# Patient Record
Sex: FEMALE | Race: WHITE | HISPANIC OR LATINO | Employment: FULL TIME | URBAN - METROPOLITAN AREA
[De-identification: names, ages, dates, MRNs, and addresses within clinical notes are randomized per-mention and may not be internally consistent; named-entity substitution may affect disease eponyms.]

---

## 2017-05-10 ENCOUNTER — HOSPITAL ENCOUNTER (OUTPATIENT)
Dept: RADIOLOGY | Facility: MEDICAL CENTER | Age: 22
End: 2017-05-10

## 2017-05-10 ENCOUNTER — HOSPITAL ENCOUNTER (EMERGENCY)
Facility: MEDICAL CENTER | Age: 22
End: 2017-05-10
Attending: EMERGENCY MEDICINE | Admitting: SURGERY
Payer: COMMERCIAL

## 2017-05-10 VITALS
TEMPERATURE: 98.8 F | OXYGEN SATURATION: 94 % | RESPIRATION RATE: 16 BRPM | DIASTOLIC BLOOD PRESSURE: 73 MMHG | HEART RATE: 105 BPM | SYSTOLIC BLOOD PRESSURE: 117 MMHG | BODY MASS INDEX: 27.31 KG/M2 | HEIGHT: 64 IN | WEIGHT: 160 LBS

## 2017-05-10 DIAGNOSIS — K35.30 ACUTE APPENDICITIS WITH LOCALIZED PERITONITIS: ICD-10-CM

## 2017-05-10 LAB — HCG SERPL QL: NEGATIVE

## 2017-05-10 PROCEDURE — 501399 HCHG SPECIMAN BAG, ENDO CATC: Performed by: SURGERY

## 2017-05-10 PROCEDURE — 501570 HCHG TROCAR, SEPARATOR: Performed by: SURGERY

## 2017-05-10 PROCEDURE — 84703 CHORIONIC GONADOTROPIN ASSAY: CPT

## 2017-05-10 PROCEDURE — 160028 HCHG SURGERY MINUTES - 1ST 30 MINS LEVEL 3: Performed by: SURGERY

## 2017-05-10 PROCEDURE — 160002 HCHG RECOVERY MINUTES (STAT): Performed by: SURGERY

## 2017-05-10 PROCEDURE — 96365 THER/PROPH/DIAG IV INF INIT: CPT

## 2017-05-10 PROCEDURE — 160035 HCHG PACU - 1ST 60 MINS PHASE I: Performed by: SURGERY

## 2017-05-10 PROCEDURE — 501838 HCHG SUTURE GENERAL: Performed by: SURGERY

## 2017-05-10 PROCEDURE — A6402 STERILE GAUZE <= 16 SQ IN: HCPCS | Performed by: SURGERY

## 2017-05-10 PROCEDURE — 700111 HCHG RX REV CODE 636 W/ 250 OVERRIDE (IP)

## 2017-05-10 PROCEDURE — 501571 HCHG TROCAR, SEPARATOR 12X100: Performed by: SURGERY

## 2017-05-10 PROCEDURE — 501450 HCHG STAPLES, ENDO MULTIFIRE: Performed by: SURGERY

## 2017-05-10 PROCEDURE — 160025 RECOVERY II MINUTES (STATS): Performed by: SURGERY

## 2017-05-10 PROCEDURE — 160046 HCHG PACU - 1ST 60 MINS PHASE II: Performed by: SURGERY

## 2017-05-10 PROCEDURE — 160036 HCHG PACU - EA ADDL 30 MINS PHASE I: Performed by: SURGERY

## 2017-05-10 PROCEDURE — 700102 HCHG RX REV CODE 250 W/ 637 OVERRIDE(OP)

## 2017-05-10 PROCEDURE — 700101 HCHG RX REV CODE 250

## 2017-05-10 PROCEDURE — 502240 HCHG MISC OR SUPPLY RC 0272: Performed by: SURGERY

## 2017-05-10 PROCEDURE — 160009 HCHG ANES TIME/MIN: Performed by: SURGERY

## 2017-05-10 PROCEDURE — 160048 HCHG OR STATISTICAL LEVEL 1-5: Performed by: SURGERY

## 2017-05-10 PROCEDURE — 502571 HCHG PACK, LAP CHOLE: Performed by: SURGERY

## 2017-05-10 PROCEDURE — A9270 NON-COVERED ITEM OR SERVICE: HCPCS

## 2017-05-10 PROCEDURE — 502594 HCHG SCISSOR HANDLE, HARMONIC ACE: Performed by: SURGERY

## 2017-05-10 PROCEDURE — 160039 HCHG SURGERY MINUTES - EA ADDL 1 MIN LEVEL 3: Performed by: SURGERY

## 2017-05-10 PROCEDURE — 36415 COLL VENOUS BLD VENIPUNCTURE: CPT

## 2017-05-10 PROCEDURE — 501583 HCHG TROCAR, THRD CAN&SEAL 5X100: Performed by: SURGERY

## 2017-05-10 PROCEDURE — 700101 HCHG RX REV CODE 250: Performed by: EMERGENCY MEDICINE

## 2017-05-10 PROCEDURE — 88304 TISSUE EXAM BY PATHOLOGIST: CPT

## 2017-05-10 PROCEDURE — 99291 CRITICAL CARE FIRST HOUR: CPT

## 2017-05-10 RX ORDER — HYDROMORPHONE HYDROCHLORIDE 2 MG/ML
INJECTION, SOLUTION INTRAMUSCULAR; INTRAVENOUS; SUBCUTANEOUS
Status: COMPLETED
Start: 2017-05-10 | End: 2017-05-10

## 2017-05-10 RX ORDER — HYDROCODONE BITARTRATE AND ACETAMINOPHEN 5; 325 MG/1; MG/1
1-2 TABLET ORAL EVERY 6 HOURS PRN
Status: DISCONTINUED | OUTPATIENT
Start: 2017-05-10 | End: 2017-05-10 | Stop reason: HOSPADM

## 2017-05-10 RX ORDER — SODIUM CHLORIDE, SODIUM LACTATE, POTASSIUM CHLORIDE, CALCIUM CHLORIDE 600; 310; 30; 20 MG/100ML; MG/100ML; MG/100ML; MG/100ML
1000 INJECTION, SOLUTION INTRAVENOUS
Status: COMPLETED | OUTPATIENT
Start: 2017-05-10 | End: 2017-05-10

## 2017-05-10 RX ORDER — CEFOTETAN DISODIUM 1 G/10ML
1 INJECTION, POWDER, FOR SOLUTION INTRAMUSCULAR; INTRAVENOUS ONCE
Status: COMPLETED | OUTPATIENT
Start: 2017-05-10 | End: 2017-05-10

## 2017-05-10 RX ORDER — BUPIVACAINE HYDROCHLORIDE AND EPINEPHRINE 5; 5 MG/ML; UG/ML
INJECTION, SOLUTION EPIDURAL; INTRACAUDAL; PERINEURAL
Status: DISCONTINUED | OUTPATIENT
Start: 2017-05-10 | End: 2017-05-10 | Stop reason: HOSPADM

## 2017-05-10 RX ORDER — OXYCODONE HCL 5 MG/5 ML
SOLUTION, ORAL ORAL
Status: COMPLETED
Start: 2017-05-10 | End: 2017-05-10

## 2017-05-10 RX ORDER — HYDROCODONE BITARTRATE AND ACETAMINOPHEN 5; 325 MG/1; MG/1
1-2 TABLET ORAL EVERY 6 HOURS PRN
Qty: 20 TAB | Refills: 0 | Status: SHIPPED | OUTPATIENT
Start: 2017-05-10 | End: 2020-02-02

## 2017-05-10 RX ADMIN — OXYCODONE HYDROCHLORIDE 10 MG: 5 SOLUTION ORAL at 09:15

## 2017-05-10 RX ADMIN — FENTANYL CITRATE 25 MCG: 50 INJECTION, SOLUTION INTRAMUSCULAR; INTRAVENOUS at 09:27

## 2017-05-10 RX ADMIN — CEFOTETAN DISODIUM 1 G: 1 INJECTION, POWDER, FOR SOLUTION INTRAMUSCULAR; INTRAVENOUS at 06:18

## 2017-05-10 RX ADMIN — SODIUM CHLORIDE, SODIUM LACTATE, POTASSIUM CHLORIDE, CALCIUM CHLORIDE 1000 ML: 600; 310; 30; 20 INJECTION, SOLUTION INTRAVENOUS at 07:08

## 2017-05-10 RX ADMIN — FENTANYL CITRATE 25 MCG: 50 INJECTION, SOLUTION INTRAMUSCULAR; INTRAVENOUS at 09:50

## 2017-05-10 RX ADMIN — FENTANYL CITRATE 25 MCG: 50 INJECTION, SOLUTION INTRAMUSCULAR; INTRAVENOUS at 09:21

## 2017-05-10 RX ADMIN — FENTANYL CITRATE 25 MCG: 50 INJECTION, SOLUTION INTRAMUSCULAR; INTRAVENOUS at 09:13

## 2017-05-10 ASSESSMENT — PAIN SCALES - GENERAL
PAINLEVEL_OUTOF10: 4
PAINLEVEL_OUTOF10: 0
PAINLEVEL_OUTOF10: 7
PAINLEVEL_OUTOF10: 5
PAINLEVEL_OUTOF10: 0
PAINLEVEL_OUTOF10: 5
PAINLEVEL_OUTOF10: 6
PAINLEVEL_OUTOF10: 5
PAINLEVEL_OUTOF10: 0

## 2017-05-10 NOTE — DISCHARGE INSTRUCTIONS
ACTIVITY: Rest and take it easy for the first 24 hours.  A responsible adult is recommended to remain with you during that time.  It is normal to feel sleepy.  We encourage you to not do anything that requires balance, judgment or coordination.    MILD FLU-LIKE SYMPTOMS ARE NORMAL. YOU MAY EXPERIENCE GENERALIZED MUSCLE ACHES, THROAT IRRITATION, HEADACHE AND/OR SOME NAUSEA.    FOR 24 HOURS DO NOT:  Drive, operate machinery or run household appliances.  Drink beer or alcoholic beverages.   Make important decisions or sign legal documents.    SPECIAL INSTRUCTIONS: Laparoscopic Appendectomy D/C instructions:     1. DIET: Upon discharge from the hospital you may resume your normal pre-operative diet. Depending on how you are feeling and whether you have nausea or not, you may wish to stay with a bland diet for the first few days. However, you can advance this as quickly as you feel ready.     2. ACTIVITIES: After discharge from the hospital, you may resume full routine activities. However, there should be no heavy lifting (greater than 15 pounds) and no strenuous activities until after your follow-up visit. Otherwise, routine activities of daily living are acceptable.     3. DRIVING: You may drive whenever you are off pain medications and are able to perform the activities needed to drive, i.e. turning, bending, twisting, etc.     4. BATHING: You may get the wound wet at any time after leaving the hospital. You may shower, but do not submerge in a bath for at least a week. Dressings may come off after 48 hours.     5. BOWEL FUNCTION: A few patients, after this operation, will develop either frequent or loose stools after meals. This usually corrects itself after a few days, to a few weeks. If this occurs, do not worry; it is not unusual and will resolve. Much more common than loose stools, is constipation. The combination of pain medication and decreased activity level can cause constipation in otherwise normal  patients. If you feel this is occurring, take a laxative (Milk of Magnesia, Ex-Lax, Senokot, etc.) until the problem has resolved.     6. PAIN MEDICATION: You will be given a prescription for pain medication at discharge. Please take these as directed. It is important to remember not to take medications on an empty stomach as this may cause nausea.     7. CALL IF YOU HAVE: (1) Fevers to more than 101.0 F, (2) Unusual chest or leg pain, (3) Drainage or fluid from incision that may be foul smelling, increased tenderness or soreness at the wound or the wound edges are no longer together, redness or swelling at the incision site. Please do not hesitate to call with any other questions.     8. APPOINTMENT: Contact our office at [phone] for a follow-up appointment in 1 to 2 weeks following your procedure.     If you have any additional questions, please do not hesitate to call the office and speak to either myself or the physician on call.     Office addresses:   [address] [place] [address], [address] [place] [address]     Bonifacio Bacon MD   [facility]   [phone]      DIET: To avoid nausea, slowly advance diet as tolerated, avoiding spicy or greasy foods for the first day.  Add more substantial food to your diet according to your physician's instructions.  Babies can be fed formula or breast milk as soon as they are hungry.  INCREASE FLUIDS AND FIBER TO AVOID CONSTIPATION.    SURGICAL DRESSING/BATHING: *see above*    FOLLOW-UP APPOINTMENT:  A follow-up appointment should be arranged with your doctor in *1-2 weeks*; call to schedule.    You should CALL YOUR PHYSICIAN if you develop:  Fever greater than 101 degrees F.  Pain not relieved by medication, or persistent nausea or vomiting.  Excessive bleeding (blood soaking through dressing) or unexpected drainage from the wound.  Extreme redness or swelling around the incision site, drainage of pus or foul smelling drainage.  Inability to urinate or empty your bladder  within 8 hours.  Problems with breathing or chest pain.    You should call 911 if you develop problems with breathing or chest pain.  If you are unable to contact your doctor or surgical center, you should go to the nearest emergency room or urgent care center.  Physician's telephone #: *Dr. Bacon [phone]*    If any questions arise, call your doctor.  If your doctor is not available, please feel free to call the Surgical Center at [phone].  The Center is open Monday through Friday from 7AM to 7PM.  You can also call the [facility] HOTLINE open 24 hours/day, 7 days/week and speak to a nurse at [phone], or toll free at [phone].    A registered nurse may call you a few days after your surgery to see how you are doing after your procedure.    MEDICATIONS: Resume taking daily medication.  Take prescribed pain medication with food.  If no medication is prescribed, you may take non-aspirin pain medication if needed.  PAIN MEDICATION CAN BE VERY CONSTIPATING.  Take a stool softener or laxative such as senokot, pericolace, or milk of magnesia if needed.    Prescription given for *Norco*.  Last pain medication given at *9:15am*.    If your physician has prescribed pain medication that includes Acetaminophen (Tylenol), do not take additional Acetaminophen (Tylenol) while taking the prescribed medication.    Depression / Suicide Risk    As you are discharged from this [facility] facility, it is important to learn how to keep safe from harming yourself.    Recognize the warning signs:  · Abrupt changes in personality, positive or negative- including increase in energy   · Giving away possessions  · Change in eating patterns- significant weight changes-  positive or negative  · Change in sleeping patterns- unable to sleep or sleeping all the time   · Unwillingness or inability to communicate  · Depression  · Unusual sadness, discouragement and loneliness  · Talk of wanting to die  · Neglect of personal  appearance   · Rebelliousness- reckless behavior  · Withdrawal from people/activities they love  · Confusion- inability to concentrate     If you or a loved one observes any of these behaviors or has concerns about self-harm, here's what you can do:  · Talk about it- your feelings and reasons for harming yourself  · Remove any means that you might use to hurt yourself (examples: pills, rope, extension cords, firearm)  · Get professional help from the community (Mental Health, Substance Abuse, psychological counseling)  · Do not be alone:Call your Safe Contact- someone whom you trust who will be there for you.  · Call your local CRISIS HOTLINE 726-1857 or 388-589-8052  · Call your local Children's Mobile Crisis Response Team [place] (678) 550-4050 or www.[employer]  · Call the toll free National Suicide Prevention Hotlines   · National Suicide Prevention Lifeline 248-638-YIYG (7614)  · National Hope Line Network 800-SUICIDE (658-0174)

## 2017-05-10 NOTE — ED PROVIDER NOTES
"ED Provider Note    CHIEF COMPLAINT  Chief Complaint   Patient presents with   • Abdominal Pain     RLQ.  Appendicitis.       HPI  Josee Go is a 21 y.o. female who presents to the emergency room with abdominal pain. Started Monday afternoon. Constant. Right lower quadrant. Nonradiating. Had fevers. Patient seen at another hospital. Diagnosed with appendicitis on CT scan. Patient is given morphine and reports her pain is improved. She received Unasyn sometime around 2 this morning. Apparently at the [address] [place] [address], the physician was told that insurance was not accepted there. The patient was sent to [facility] that insurance was accepted at that facility. Unfortunately they do not accept that insurance and the patient was referred here. I was told prior to transfer that we are the primary hospital for [facility] insurance. Patient states that she feels better at this time after analgesics. She is received 3 L of saline thus far prior to my assessment.    REVIEW OF SYSTEMS  Pertinent negative: No pain nausea vomiting at this time. No dysuria or hematuria. She is not pregnant.    PAST MEDICAL HISTORY  Negative    SOCIAL HISTORY  Social History   Substance Use Topics   • Smoking status: Never Smoker    • Smokeless tobacco: Never Used   • Alcohol Use: No       SURGICAL HISTORY  No abdominal surgeries    ALLERGIES  No Known Allergies    PHYSICAL EXAM  VITAL SIGNS: /75 mmHg  Pulse 110  Temp(Src) 36.8 °C (98.2 °F)  Resp 25  Ht 1.626 m (5' 4\")  Wt 72.576 kg (160 lb)  BMI 27.45 kg/m2  LMP 04/20/2017 (Approximate)   Constitutional: Awake and alert. Nontoxic  HENT:  Grossly normal  Eyes: Grossly normal  Neck: Normal range of motion  Cardiovascular: Normal heart rate   Thorax & Lungs: No respiratory distress  Abdomen: Focal tenderness to palpation in the right lower quadrant. No rebound or peritonitis. Normal bowel sounds.  Skin:  No pathologic rash.   Extremities: " Well perfused  Psychiatric: Affect normal    RADIOLOGY/PROCEDURES  CT scan report was reviewed from outside facility demonstrating appendicitis.     Imaging is interpreted by radiologist    Labs:  CBC: WBC count 8.3. Urinalysis with 2+ leukocyte esterase.    Results for orders placed or performed during the hospital encounter of 05/10/17   HCG QUAL SERUM   Result Value Ref Range    Beta-Hcg Qualitative Serum Negative Negative     COURSE & MEDICAL DECISION MAKING  Patient presents to this ER with appendicitis. He is received 3 L of fluid so far. She declines any pain medication. She was given Unasyn prior to arrival. I discussed with Dr. Bacon. He requested cefotetan. This was ordered. Plan is for patient to go to the operating room this morning.    FINAL IMPRESSION  1. Acute appendicitis      Disposition: Admission      This dictation was created using voice recognition software. The accuracy of the dictation is limited to the abilities of the software.  The nursing notes were reviewed and certain aspects of this information were incorporated into this note.      Electronically signed by: Zack Ogden, 5/10/2017 5:24 AM

## 2017-05-10 NOTE — ED NOTES
.  Chief Complaint   Patient presents with   • Abdominal Pain     RLQ.  Appendicitis.     BIB [facility] for above.  Patient transferred from [facility] to [place] to [facility] r/t insurance coverage.    Given Unasyn, 8 mg total morphine, 8 mg total zofran, 3 L NS PTA.      Denies pain/n/v other symptoms at this time.

## 2017-05-10 NOTE — OP REPORT
Operative Report    Date: 5/10/2017    Surgeon: Bonifacio Bacon MD    Assistant: none    Pre-operative Diagnosis: acute appendicitis    Post-operative Diagnosis: acute appendicitis     Procedure: laparoscopic appendectomy    Indications: 21 year old female with right lower quadrant pain and a CT scan showing appendicitis    An extensive PARQ conference was held with the patient and family, in regard to the surgical treatment of acute appendicitis. The patient was counseled regarding the benefits of the operation, namely, laparoscopic VS open appendectomy. The patient was made aware of the alternatives, including operative and non-operative management. The risks of bleeding, infection, damage to surrounding structures, need for reoperation, stroke, MI, and death were discussed with the patient. The patient was given a chance to ask questions, and all questions were answered. The patient and family demonstrated adequate understanding, seemed pleased with the plan, and wished to proceed. Consent was signed.    Findings: acute, non ruptured    Procedure in detail: The patient was identified in the pre-operative holding area and brought to the operating room. Correct side and site were identified. Milena-operative antibiotics of cefotan were administered prior to the procedure. GETA was smoothly induced. The patient was prepped and draped in the usual sterile fashion.     After infiltration of local anesthetic, an incision was made below the umbilicus to accomodate a 5 mm trocar. The veress needle was placed intraperitoneally, and sterile saline drop test was performed. The abdomen was insufflated to 15 mmHg, which the patient tolerated well, with initially low insufflation pressures. A 5 mm trocar was placed, and a 5 mm 30 degree lapararoscope was used to survey the abdomen. No evidence of intraperitoneal trauma from Veress or trocar placement was found.    I then placed, under video assistance, a 5 mm trocar in the  suprapubic area, and a 12 mm trocar in the left lower quadrant. I visualized the cecum and found the appendix, it was inflamed. The appendix was elevated with an atraumatic grasper, a window was created in its mesentery with a [place] dissector, and the appendiceal mesentery was divided with a the harmonic. The base of the appendix was divided with a white load of the endoGIA stapler. The appendix was placed in an endocatch bag and removed from the 12 mm port site. The appendiceal artery and base were hemostatic.    All ports were removed with video assist, and were hemostatic. The 12 mm port site fascia was closed with 0 vicryl suture. All skin sites were closed with subcuticular Monocryl and sterile dressings were applied.    The patient was awakened from general anesthetic, and was taken to the recovery room in stable condition.    Sponge and needle counts were correct at the end of the case.     Specimen: appendix for permanent pathology    EBL: 5 mLs    Dispo: home    Bonifacio Bacon MD  [facility]  [phone]

## 2017-05-10 NOTE — ED NOTES
Started IV antibiodics.  Gave report to Pre-Op.  Patient in gown only, up to bathroom to void.  Last oral intake at 1900 last night. Estimated time for pre-op to pick patient up is 0700.  Patient aware.  VSS at this time.

## 2017-05-10 NOTE — H&P
Surgical History and Physical    Date: 5/10/2017    Requesting Physician: Dr. Ogden  PCP: Shiva Benjamin M.D.  Attending Physician: Bonifacio Bacon M.D. [facility]    CC: abdominal pain    HPI: This is a 21 y.o. female who is has had abdominal pain for the past 48 hours. Her pain is located in the right lower quadrant, does not radiate, is mild in severity, and has no exacerbating or alleviating factors. Denies vomiting but does have some nausea. Denies diarrhea. She was sent from [facility] to [facility], then here for insurance reasons.     History reviewed. No pertinent past medical history.    History reviewed. No pertinent past surgical history.    No current facility-administered medications for this encounter.       Social History     Social History   • Marital Status: Single     Spouse Name: N/A   • Number of Children: N/A   • Years of Education: N/A     Occupational History   • Not on file.     Social History Main Topics   • Smoking status: Never Smoker    • Smokeless tobacco: Never Used   • Alcohol Use: No   • Drug Use: No   • Sexual Activity: Not on file     Other Topics Concern   • Not on file     Social History Narrative   • No narrative on file       History reviewed. No pertinent family history.    Allergies:  Review of patient's allergies indicates no known allergies.    Review of Systems:  Constitutional: Negative for fever, chills, weight loss, malaise/fatigue and diaphoresis.   HENT: Negative for hearing loss, ear pain, nosebleeds, congestion, sore throat, neck pain, tinnitus and ear discharge.    Eyes: Negative for blurred vision, double vision, photophobia, pain, discharge and redness.   Respiratory: Negative for cough, hemoptysis, sputum production, shortness of breath, wheezing and stridor.    Cardiovascular: Negative for chest pain, palpitations, orthopnea, claudication, leg swelling and PND.   Gastrointestinal: per hpi  Genitourinary: Negative for dysuria, urgency, frequency,  "hematuria and flank pain.   Musculoskeletal: Negative for myalgias, back pain, joint pain and falls.   Skin: Negative for itching and rash.  Neurological: Negative for dizziness, tingling, tremors, sensory change, speech change, focal weakness, seizures, loss of consciousness, weakness and headaches.   Endo/Heme/Allergies: Negative for environmental allergies and polydipsia. Does not bruise/bleed easily.   Psychiatric/Behavioral: Negative for depression, memory loss, and substance abuse. The patient is not nervous/anxious and does not have insomnia.    Physical Exam:  Blood pressure 117/73, pulse 105, temperature 36.9 °C (98.4 °F), resp. rate 18, height 1.626 m (5' 4\"), weight 72.576 kg (160 lb), last menstrual period 04/20/2017, SpO2 98 %.    Constitutional: she is oriented to person, place, and time.  she appears well-developed and well-nourished. No distress.   Head: Normocephalic and atraumatic.   Neck: Normal range of motion. Neck supple. No JVD present. No tracheal deviation present. No thyromegaly present.   Cardiovascular: Normal rate, regular rhythm, normal heart sounds and intact distal pulses.  Exam reveals no gallop and no friction rub.  No murmur heard.  Pulmonary/Chest: Effort normal and breath sounds normal. No stridor. No respiratory distress. she has no wheezes. There are no rales.   Abdominal: Soft. Bowel sounds are normal. she exhibits no distension and no mass. There is no tenderness. There is no rebound and no guarding.   Musculoskeletal: Normal range of motion. she exhibits no edema and no tenderness.   Neurological: she is alert and oriented to person, place, and time. she has normal reflexes. No cranial nerve deficit. Coordination normal.   Skin: Skin is warm and dry. No rash noted. she is not diaphoretic. No erythema. No pallor.   Psychiatric: she has a normal mood and affect.  Behavior is normal.       Labs: from referring were reportedly negative                    Radiology:  OUTSIDE " IMAGES-CT ABDOMEN /PELVIS   Preliminary Result      CT from referring shows a large dilated appendix lying medially off the colon    Assessment: This is a 22 yo female with acute appendicitis      Plan: Cefotan given in ER  PARQ held, plan for laparoscopic appendectomy    Thank you very much for this consultation.    Bonifacio Bacon MD  [facility]  [phone]    Time spent: 35 minutes

## 2017-05-10 NOTE — IP AVS SNAPSHOT
" Home Care Instructions                                                                                                                Name:Josee Go  Medical Record Number:[id number]  CSN: [id number]    YOB: 1995   Age: 21 y.o.  Sex: female  HT:1.626 m (5' 4\") WT: 72.576 kg (160 lb)          Admit Date: 5/10/2017     Discharge Date:   Today's Date: 5/10/2017  Attending Doctor:  Pamela att. providers found                  Allergies:  Review of patient's allergies indicates no known allergies.                Discharge Instructions         ACTIVITY: Rest and take it easy for the first 24 hours.  A responsible adult is recommended to remain with you during that time.  It is normal to feel sleepy.  We encourage you to not do anything that requires balance, judgment or coordination.    MILD FLU-LIKE SYMPTOMS ARE NORMAL. YOU MAY EXPERIENCE GENERALIZED MUSCLE ACHES, THROAT IRRITATION, HEADACHE AND/OR SOME NAUSEA.    FOR 24 HOURS DO NOT:  Drive, operate machinery or run household appliances.  Drink beer or alcoholic beverages.   Make important decisions or sign legal documents.    SPECIAL INSTRUCTIONS: Laparoscopic Appendectomy D/C instructions:     1. DIET: Upon discharge from the hospital you may resume your normal pre-operative diet. Depending on how you are feeling and whether you have nausea or not, you may wish to stay with a bland diet for the first few days. However, you can advance this as quickly as you feel ready.     2. ACTIVITIES: After discharge from the hospital, you may resume full routine activities. However, there should be no heavy lifting (greater than 15 pounds) and no strenuous activities until after your follow-up visit. Otherwise, routine activities of daily living are acceptable.     3. DRIVING: You may drive whenever you are off pain medications and are able to perform the activities needed to drive, i.e. turning, bending, twisting, etc.     4. BATHING: You may get the wound wet at any time " after leaving the hospital. You may shower, but do not submerge in a bath for at least a week. Dressings may come off after 48 hours.     5. BOWEL FUNCTION: A few patients, after this operation, will develop either frequent or loose stools after meals. This usually corrects itself after a few days, to a few weeks. If this occurs, do not worry; it is not unusual and will resolve. Much more common than loose stools, is constipation. The combination of pain medication and decreased activity level can cause constipation in otherwise normal patients. If you feel this is occurring, take a laxative (Milk of Magnesia, Ex-Lax, Senokot, etc.) until the problem has resolved.     6. PAIN MEDICATION: You will be given a prescription for pain medication at discharge. Please take these as directed. It is important to remember not to take medications on an empty stomach as this may cause nausea.     7. CALL IF YOU HAVE: (1) Fevers to more than 101.0 F, (2) Unusual chest or leg pain, (3) Drainage or fluid from incision that may be foul smelling, increased tenderness or soreness at the wound or the wound edges are no longer together, redness or swelling at the incision site. Please do not hesitate to call with any other questions.     8. APPOINTMENT: Contact our office at [phone] for a follow-up appointment in 1 to 2 weeks following your procedure.     If you have any additional questions, please do not hesitate to call the office and speak to either myself or the physician on call.     Office addresses:   [address] [place] [address], [address] [facility] [place] [address]     Bonifacio Bacon MD   [facility]   [phone]      DIET: To avoid nausea, slowly advance diet as tolerated, avoiding spicy or greasy foods for the first day.  Add more substantial food to your diet according to your physician's instructions.  Babies can be fed formula or breast milk as soon as they are hungry.  INCREASE FLUIDS AND FIBER TO AVOID  CONSTIPATION.    SURGICAL DRESSING/BATHING: *see above*    FOLLOW-UP APPOINTMENT:  A follow-up appointment should be arranged with your doctor in *1-2 weeks*; call to schedule.    You should CALL YOUR PHYSICIAN if you develop:  Fever greater than 101 degrees F.  Pain not relieved by medication, or persistent nausea or vomiting.  Excessive bleeding (blood soaking through dressing) or unexpected drainage from the wound.  Extreme redness or swelling around the incision site, drainage of pus or foul smelling drainage.  Inability to urinate or empty your bladder within 8 hours.  Problems with breathing or chest pain.    You should call 911 if you develop problems with breathing or chest pain.  If you are unable to contact your doctor or surgical center, you should go to the nearest emergency room or urgent care center.  Physician's telephone #: *Dr. Bacon [phone]*    If any questions arise, call your doctor.  If your doctor is not available, please feel free to call the Surgical Center at [phone].  The Center is open Monday through Friday from 7AM to 7PM.  You can also call the [facility] HOTLINE open 24 hours/day, 7 days/week and speak to a nurse at [phone], or toll free at [phone].    A registered nurse may call you a few days after your surgery to see how you are doing after your procedure.    MEDICATIONS: Resume taking daily medication.  Take prescribed pain medication with food.  If no medication is prescribed, you may take non-aspirin pain medication if needed.  PAIN MEDICATION CAN BE VERY CONSTIPATING.  Take a stool softener or laxative such as senokot, pericolace, or milk of magnesia if needed.    Prescription given for *Norco*.  Last pain medication given at *9:15am*.    If your physician has prescribed pain medication that includes Acetaminophen (Tylenol), do not take additional Acetaminophen (Tylenol) while taking the prescribed medication.    Depression / Suicide Risk    As you are  discharged from this [facility] facility, it is important to learn how to keep safe from harming yourself.    Recognize the warning signs:  · Abrupt changes in personality, positive or negative- including increase in energy   · Giving away possessions  · Change in eating patterns- significant weight changes-  positive or negative  · Change in sleeping patterns- unable to sleep or sleeping all the time   · Unwillingness or inability to communicate  · Depression  · Unusual sadness, discouragement and loneliness  · Talk of wanting to die  · Neglect of personal appearance   · Rebelliousness- reckless behavior  · Withdrawal from people/activities they love  · Confusion- inability to concentrate     If you or a loved one observes any of these behaviors or has concerns about self-harm, here's what you can do:  · Talk about it- your feelings and reasons for harming yourself  · Remove any means that you might use to hurt yourself (examples: pills, rope, extension cords, firearm)  · Get professional help from the community (Mental Health, Substance Abuse, psychological counseling)  · Do not be alone:Call your Safe Contact- someone whom you trust who will be there for you.  · Call your local CRISIS HOTLINE 985-9119 or 927-699-9894  · Call your local Children's Mobile Crisis Response Team [place] (943) 353-3595 or www.nubelo  · Call the toll free National Suicide Prevention Hotlines   · National Suicide Prevention Lifeline 210-907-LYCO (3212)  · National Hope Line Network 800-SUICIDE (952-0920)       Medication List      START taking these medications        Instructions    Morning Afternoon Evening Bedtime    hydrocodone-acetaminophen 5-325 MG Tabs per tablet   Commonly known as:  NORCO        Take 1-2 Tabs by mouth every 6 hours as needed.   Dose:  1-2 Tab                             Where to Get Your Medications      You can get these medications from any pharmacy     Bring a paper prescription for each of  these medications    - hydrocodone-acetaminophen 5-325 MG Tabs per tablet            Medication Information     Above and/or attached are the medications your physician expects you to take upon discharge. Review all of your home medications and newly ordered medications with your doctor and/or pharmacist. Follow medication instructions as directed by your doctor and/or pharmacist. Please keep your medication list with you and share with your physician. Update the information when medications are discontinued, doses are changed, or new medications (including over-the-counter products) are added; and carry medication information at all times in the event of emergency situations.        Resources     Quit Smoking / Tobacco Use:    I understand the use of any tobacco products increases my chance of suffering from future heart disease or stroke and could cause other illnesses which may shorten my life. Quitting the use of tobacco products is the single most important thing I can do to improve my health. For further information on smoking / tobacco cessation call a Toll Free Quit Line at 1-733.982.3737 (*National [employer] [place]) or 1-996.355.9829 (American Lung Association) or you can access the web based program at www.lungusa.org.    [place] Tobacco Users Help Line:  (669) 485-4636       Toll Free: 1-321.329.9312  Quit Tobacco Program [facility] (428)859-3612    Crisis Hotline:    [place] Crisis Hotline:  1-960-SVPRKNX or 1-922.247.5509    [place] Crisis Hotline:    1-847.478.8819 or 918-686-7747    Discharge Survey:   Thank you for choosing [facility]. We hope we did everything we could to make your hospital stay a pleasant one. You may be receiving a survey and we would appreciate your time and participation in answering the questions. Your input is very valuable to us in our efforts to improve our service to our patients and their families.            Signatures     My signature on this  form indicates that:    1. I acknowledge receipt and understanding of these Home Care Instruction.  2. My questions regarding this information have been answered to my satisfaction.  3. I have formulated a plan with my discharge nurse to obtain my prescribed medications for home.    __________________________________      __________________________________                   Patient Signature                                 Guardian/Responsible Adult Signature      __________________________________                 __________       ________                       Nurse Signature                                               Date                 Time

## 2017-05-10 NOTE — IP AVS SNAPSHOT
5/10/2017    Josee Go  [address] [place] [address]    Dear Josee:    Critical access hospital wants to ensure your discharge home is safe and you or your loved ones have had all of your questions answered regarding your care after you leave the hospital.    Below is a list of resources and contact information should you have any questions regarding your hospital stay, follow-up instructions, or active medical symptoms.    Questions or Concerns Regarding… Contact   Medical Questions Related to Your Discharge  (7 days a week, 8am-5pm) Contact a Nurse Care Coordinator   [phone]   Medical Questions Not Related to Your Discharge  (24 hours a day / 7 days a week)  Contact the Nurse Health Line   [phone]    Medications or Discharge Instructions Refer to your discharge packet   or contact your [facility] Primary Care Provider   [phone]   Follow-up Appointment(s) Schedule your appointment via PANOSOL   or contact Scheduling [phone]   Billing Review your statement via PANOSOL  or contact Billing [phone]   Medical Records Review your records via PANOSOL   or contact Medical Records [phone]     You may receive a telephone call within two days of discharge. This call is to make certain you understand your discharge instructions and have the opportunity to have any questions answered. You can also easily access your medical information, test results and upcoming appointments via the PANOSOL free online health management tool. You can learn more and sign up at [employer]/PANOSOL. For assistance setting up your PANOSOL account, please call [phone].    Once again, we want to ensure your discharge home is safe and that you have a clear understanding of any next steps in your care. If you have any questions or concerns, please do not hesitate to contact us, we are here for you. Thank you for choosing [facility] for your healthcare needs.    Sincerely,    Your [facility] Healthcare Team

## 2017-05-10 NOTE — OR NURSING
Pt verbalizes readiness for discharge. Instructions reviewed with pt and pt's family. No further needs.

## 2018-05-21 ENCOUNTER — OFFICE VISIT (OUTPATIENT)
Dept: URGENT CARE | Facility: PHYSICIAN GROUP | Age: 23
End: 2018-05-21
Payer: COMMERCIAL

## 2018-05-21 VITALS
TEMPERATURE: 98.1 F | HEIGHT: 65 IN | SYSTOLIC BLOOD PRESSURE: 126 MMHG | OXYGEN SATURATION: 98 % | HEART RATE: 94 BPM | BODY MASS INDEX: 27.92 KG/M2 | DIASTOLIC BLOOD PRESSURE: 62 MMHG | WEIGHT: 167.6 LBS

## 2018-05-21 DIAGNOSIS — M54.50 ACUTE BILATERAL LOW BACK PAIN WITHOUT SCIATICA: ICD-10-CM

## 2018-05-21 DIAGNOSIS — M62.838 MUSCLE SPASM: ICD-10-CM

## 2018-05-21 LAB
APPEARANCE UR: CLEAR
BILIRUB UR STRIP-MCNC: NORMAL MG/DL
COLOR UR AUTO: NORMAL
GLUCOSE UR STRIP.AUTO-MCNC: NORMAL MG/DL
INT CON NEG: NEGATIVE
INT CON POS: POSITIVE
KETONES UR STRIP.AUTO-MCNC: NORMAL MG/DL
LEUKOCYTE ESTERASE UR QL STRIP.AUTO: NORMAL
NITRITE UR QL STRIP.AUTO: NORMAL
PH UR STRIP.AUTO: 5 [PH] (ref 5–8)
POC URINE PREGNANCY TEST: NORMAL
PROT UR QL STRIP: NORMAL MG/DL
RBC UR QL AUTO: NORMAL
SP GR UR STRIP.AUTO: 1.01
UROBILINOGEN UR STRIP-MCNC: NORMAL MG/DL

## 2018-05-21 PROCEDURE — 81002 URINALYSIS NONAUTO W/O SCOPE: CPT | Performed by: NURSE PRACTITIONER

## 2018-05-21 PROCEDURE — 99204 OFFICE O/P NEW MOD 45 MIN: CPT | Performed by: NURSE PRACTITIONER

## 2018-05-21 PROCEDURE — 81025 URINE PREGNANCY TEST: CPT | Performed by: NURSE PRACTITIONER

## 2018-05-21 RX ORDER — CYCLOBENZAPRINE HCL 5 MG
5 TABLET ORAL 2 TIMES DAILY PRN
Qty: 8 TAB | Refills: 0 | Status: SHIPPED | OUTPATIENT
Start: 2018-05-21 | End: 2018-05-25

## 2018-05-21 NOTE — LETTER
May 21, 2018       Patient: Josee Go   YOB: 1995   Date of Visit: 5/21/2018         To Whom It May Concern:    It is my medical opinion that Josee Go be excused from lifting/carrying objects > 10# or standing for no more than 5 hrs /shift. May return to regular job duties on 5/28/18.  If you have any questions or concerns, please don't hesitate to call [phone]          Sincerely,          STELLA Mi.N.P.  Electronically Signed

## 2018-05-22 ASSESSMENT — ENCOUNTER SYMPTOMS
FALLS: 0
FEVER: 0
NAUSEA: 0
SENSORY CHANGE: 0
ABDOMINAL PAIN: 0
BACK PAIN: 1
MYALGIAS: 1
SHORTNESS OF BREATH: 0
TINGLING: 0
WEAKNESS: 0
CHILLS: 0
VOMITING: 0
COUGH: 0
FLANK PAIN: 0

## 2018-05-22 NOTE — PROGRESS NOTES
"Subjective:      Josee Go is a 22 y.o. female who presents with Back Pain (lower back pain, not radiating x 2 days )            HPI  Josee is here for acute low back pain x 2 days. No previous back injury. Unknown cause of pain. Works out 3x/week- last session 3 days ago- cardio only. Denies dysuria, hematuria, presently on menstrual cycle- on last day- \"regular blood flow\". Twisting, sitting, bending over causes pain. \"Throbbing pain\".  Intermittent Ibuprofen use. 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    PMH:  has no past medical history on file.  MEDS:   Current Outpatient Prescriptions:   •  hydrocodone-acetaminophen (NORCO) 5-325 MG Tab per tablet, Take 1-2 Tabs by mouth every 6 hours as needed. (Patient not taking: Reported on 5/21/2018), Disp: 20 Tab, Rfl: 0  ALLERGIES: No Known Allergies  SURGHX:   Past Surgical History:   Procedure Laterality Date   • APPENDECTOMY LAPAROSCOPIC  5/10/2017    Procedure: APPENDECTOMY LAPAROSCOPIC;  Surgeon: Bonifacio Bacon M.D.;  Location: SURGERY [facility];  Service:      SOCHX:  reports that she has never smoked. She has never used smokeless tobacco. She reports that she does not drink alcohol or use drugs.  FH: Family history was reviewed, no pertinent findings to report    Review of Systems   Constitutional: Negative for chills, fever and malaise/fatigue.   Respiratory: Negative for cough and shortness of breath.    Gastrointestinal: Negative for abdominal pain, nausea and vomiting.   Genitourinary: Negative for dysuria, flank pain, frequency, hematuria and urgency.   Musculoskeletal: Positive for back pain and myalgias. Negative for falls.   Neurological: Negative for tingling, sensory change and weakness.   All other systems reviewed and are negative.         Objective:     /62   Pulse 94   Temp 36.7 °C (98.1 °F)   Ht 1.638 m (5' 4.5\")   Wt 76 kg (167 lb 9.6 oz)   LMP 05/16/2018   SpO2 98%   Breastfeeding? No   BMI 28.32 kg/m²  "     Physical Exam   Constitutional: She is oriented to person, place, and time. Vital signs are normal. She appears well-developed and well-nourished. She is active and cooperative.  Non-toxic appearance. She does not have a sickly appearance. She does not appear ill. No distress.   HENT:   Head: Normocephalic.   Eyes: Conjunctivae and EOM are normal. Pupils are equal, round, and reactive to light.   Neck: Normal range of motion. Neck supple.   Cardiovascular: Normal rate and regular rhythm.    Pulmonary/Chest: Effort normal and breath sounds normal. No accessory muscle usage. No respiratory distress.   Musculoskeletal:        Lumbar back: She exhibits decreased range of motion, tenderness, pain and spasm. She exhibits no bony tenderness, no swelling, no edema, no deformity and no laceration.   Neurological: She is alert and oriented to person, place, and time.   Skin: Skin is warm and dry. She is not diaphoretic.   Vitals reviewed.              Assessment/Plan:     1. Acute bilateral low back pain without sciatica    - POCT Urinalysis: blood (on menstrual cycle)  - POCT Pregnancy: NEG    2. Muscle spasm    - cyclobenzaprine (FLEXERIL) 5 MG tablet; Take 1 Tab by mouth 2 times a day as needed for Muscle Spasms for up to 4 days. Causes drowsiness, do not drive or work while using.  Dispense: 8 Tab; Refill: 0    Take Ibuprofen prn for discomfort  May use cool compresses for swelling and warm compresses for muscle stiffness   May perform muscle stretches as tolerated after warm compresses to maintain mobility, avoid abdominal crunches  May continue Flexeril prn when at home only   May apply topical analgesics prn  Perform proper body mechanics with lifting, twisting, bending and reaching. Ask for assistance with heay objects  Monitor for bowel/urination problems, numbness/tingling in lower extremities, decreased ROM with ambulation difficulty- need re-evaluation  Work note provided

## 2019-03-23 ENCOUNTER — HOSPITAL ENCOUNTER (OUTPATIENT)
Facility: MEDICAL CENTER | Age: 24
End: 2019-03-23
Attending: PHYSICIAN ASSISTANT
Payer: COMMERCIAL

## 2019-03-23 ENCOUNTER — OFFICE VISIT (OUTPATIENT)
Dept: URGENT CARE | Facility: PHYSICIAN GROUP | Age: 24
End: 2019-03-23
Payer: COMMERCIAL

## 2019-03-23 VITALS
SYSTOLIC BLOOD PRESSURE: 112 MMHG | HEART RATE: 100 BPM | BODY MASS INDEX: 29.23 KG/M2 | HEIGHT: 63 IN | OXYGEN SATURATION: 97 % | DIASTOLIC BLOOD PRESSURE: 62 MMHG | WEIGHT: 165 LBS | TEMPERATURE: 98.9 F | RESPIRATION RATE: 16 BRPM

## 2019-03-23 DIAGNOSIS — N30.00 ACUTE CYSTITIS WITHOUT HEMATURIA: ICD-10-CM

## 2019-03-23 DIAGNOSIS — B35.4 TINEA OF THE BODY: ICD-10-CM

## 2019-03-23 DIAGNOSIS — N89.8 VAGINAL DISCHARGE: ICD-10-CM

## 2019-03-23 DIAGNOSIS — R39.15 URINARY URGENCY: ICD-10-CM

## 2019-03-23 LAB
APPEARANCE UR: NORMAL
BILIRUB UR STRIP-MCNC: NEGATIVE MG/DL
COLOR UR AUTO: YELLOW
GLUCOSE UR STRIP.AUTO-MCNC: NEGATIVE MG/DL
INT CON NEG: NEGATIVE
INT CON POS: POSITIVE
KETONES UR STRIP.AUTO-MCNC: NEGATIVE MG/DL
LEUKOCYTE ESTERASE UR QL STRIP.AUTO: NORMAL
NITRITE UR QL STRIP.AUTO: POSITIVE
PH UR STRIP.AUTO: 7 [PH] (ref 5–8)
POC URINE PREGNANCY TEST: NEGATIVE
PROT UR QL STRIP: NEGATIVE MG/DL
RBC UR QL AUTO: NORMAL
SP GR UR STRIP.AUTO: 1.02
UROBILINOGEN UR STRIP-MCNC: 0.2 MG/DL

## 2019-03-23 PROCEDURE — 87660 TRICHOMONAS VAGIN DIR PROBE: CPT

## 2019-03-23 PROCEDURE — 87480 CANDIDA DNA DIR PROBE: CPT

## 2019-03-23 PROCEDURE — 99214 OFFICE O/P EST MOD 30 MIN: CPT | Performed by: PHYSICIAN ASSISTANT

## 2019-03-23 PROCEDURE — 81025 URINE PREGNANCY TEST: CPT | Performed by: PHYSICIAN ASSISTANT

## 2019-03-23 PROCEDURE — 81002 URINALYSIS NONAUTO W/O SCOPE: CPT | Performed by: PHYSICIAN ASSISTANT

## 2019-03-23 PROCEDURE — 87510 GARDNER VAG DNA DIR PROBE: CPT

## 2019-03-23 RX ORDER — NITROFURANTOIN 25; 75 MG/1; MG/1
100 CAPSULE ORAL EVERY 12 HOURS
Qty: 10 CAP | Refills: 0 | Status: SHIPPED | OUTPATIENT
Start: 2019-03-23 | End: 2019-03-28

## 2019-03-23 RX ORDER — KETOCONAZOLE 20 MG/G
CREAM TOPICAL
Qty: 1 TUBE | Refills: 1 | Status: SHIPPED | OUTPATIENT
Start: 2019-03-23 | End: 2021-03-01

## 2019-03-23 ASSESSMENT — ENCOUNTER SYMPTOMS
ABDOMINAL PAIN: 0
FEVER: 0
VOMITING: 0
CHILLS: 0
SHORTNESS OF BREATH: 0
NAUSEA: 0
FLANK PAIN: 0

## 2019-03-23 NOTE — PROGRESS NOTES
"Subjective:   Josee Go is a 23 y.o. female who presents for UTI (x 4 days )        UTI   This is a new problem. The current episode started in the past 7 days. Pertinent negatives include no abdominal pain, chills, fever, nausea, rash or vomiting.     Notes last 4d of vaginal irritation, itch, some discharge white, denies dysuria/freq, denies hematuria, PMH of UTI years ago, denies recent medications, LMP was   Denies PMH of complicated UTI.  Denies PMH of pyeolnephritis.  Denies concern of STi or chance of preg.     Review of Systems   Constitutional: Negative for chills and fever.   Respiratory: Negative for shortness of breath.    Gastrointestinal: Negative for abdominal pain, nausea and vomiting.   Genitourinary: Positive for frequency and urgency. Negative for dysuria, flank pain and hematuria.        Some abnormal d/c   Skin: Negative for rash.     No Known Allergies   Objective:   /62   Pulse 100   Temp 37.2 °C (98.9 °F) (Temporal)   Resp 16   Ht 1.6 m (5' 3\")   Wt 74.8 kg (165 lb)   SpO2 97%   BMI 29.23 kg/m²   Physical Exam   Constitutional: She is oriented to person, place, and time. She appears well-developed and well-nourished. No distress.   HENT:   Head: Normocephalic and atraumatic.   Right Ear: External ear normal.   Left Ear: External ear normal.   Nose: Nose normal.   Eyes: Conjunctivae and lids are normal. Right eye exhibits no discharge. Left eye exhibits no discharge. No scleral icterus.   Neck: Neck supple.   Pulmonary/Chest: Effort normal. No respiratory distress.   Abdominal: Soft. Normal appearance. There is no tenderness. There is no rigidity, no guarding and no CVA tenderness.   Musculoskeletal: Normal range of motion.   Neurological: She is alert and oriented to person, place, and time. She is not disoriented.   Skin: Skin is warm and dry. Rash noted. Rash is not nodular, not pustular, not vesicular and not urticarial. She is not diaphoretic. No erythema. No pallor. "   Slightly raised broad rash w/ darker pigment, scaly advancing border, no focal lesions, non ulcerative/non urticarial/non pustular   Psychiatric: Her speech is normal and behavior is normal.   Nursing note and vitals reviewed.  POCT UA- +++  Vag path - pending      Assessment/Plan:   1. Acute cystitis without hematuria  - POCT Urinalysis  - POCT PREGNANCY  - nitrofurantoin monohyd macro (MACROBID) 100 MG Cap; Take 1 Cap by mouth every 12 hours for 5 days.  Dispense: 10 Cap; Refill: 0    2. Urinary urgency    3. Tinea of the body  - ketoconazole (NIZORAL) 2 % Cream; Apply to affected area twice daily; continue treatment 1 week past resolution of rash  Dispense: 1 Tube; Refill: 1    4. Vaginal discharge  - VAGINAL PATHOGENS DNA PANEL; Future  Supportive care is reviewed with patient/caregiver - recommend to push PO fluids and electrolytes, nsaids/tylenol, rest, full course of abx, probiotics w/ abx, azo/cran, observe for resolution  Return to clinic with lack of resolution or progression of symptoms.   will call w/results of vag path  Differential diagnosis, natural history, supportive care, and indications for immediate follow-up discussed.

## 2019-03-24 LAB
CANDIDA DNA VAG QL PROBE+SIG AMP: NEGATIVE
G VAGINALIS DNA VAG QL PROBE+SIG AMP: NEGATIVE
T VAGINALIS DNA VAG QL PROBE+SIG AMP: NEGATIVE

## 2019-09-12 ENCOUNTER — HOSPITAL ENCOUNTER (OUTPATIENT)
Facility: MEDICAL CENTER | Age: 24
End: 2019-09-12
Attending: PREVENTIVE MEDICINE
Payer: COMMERCIAL

## 2019-09-12 ENCOUNTER — NON-PROVIDER VISIT (OUTPATIENT)
Dept: OCCUPATIONAL MEDICINE | Facility: CLINIC | Age: 24
End: 2019-09-12

## 2019-09-12 DIAGNOSIS — Z02.1 PRE-EMPLOYMENT DRUG SCREENING: ICD-10-CM

## 2019-09-12 LAB
AMP AMPHETAMINE: NORMAL
COC COCAINE: NORMAL
INT CON NEG: NORMAL
INT CON POS: NORMAL
MET METHAMPHETAMINES: NORMAL
OPI OPIATES: NORMAL
PCP PHENCYCLIDINE: NORMAL
POC DRUG COMMENT 753798-OCCUPATIONAL HEALTH: NEGATIVE
THC: NORMAL

## 2019-09-12 PROCEDURE — 86480 TB TEST CELL IMMUN MEASURE: CPT | Performed by: PREVENTIVE MEDICINE

## 2019-09-12 PROCEDURE — 80305 DRUG TEST PRSMV DIR OPT OBS: CPT | Performed by: PREVENTIVE MEDICINE

## 2019-09-16 LAB
GAMMA INTERFERON BACKGROUND BLD IA-ACNC: 0.03 IU/ML
M TB IFN-G BLD-IMP: NEGATIVE
M TB IFN-G CD4+ BCKGRND COR BLD-ACNC: 0 IU/ML
MITOGEN IGNF BCKGRD COR BLD-ACNC: >10 IU/ML
QFT TB2 - NIL TBQ2: 0 IU/ML

## 2020-02-02 ENCOUNTER — APPOINTMENT (OUTPATIENT)
Dept: RADIOLOGY | Facility: IMAGING CENTER | Age: 25
End: 2020-02-02
Attending: NURSE PRACTITIONER
Payer: COMMERCIAL

## 2020-02-02 ENCOUNTER — OFFICE VISIT (OUTPATIENT)
Dept: URGENT CARE | Facility: PHYSICIAN GROUP | Age: 25
End: 2020-02-02
Payer: COMMERCIAL

## 2020-02-02 VITALS
HEART RATE: 74 BPM | OXYGEN SATURATION: 96 % | HEIGHT: 63 IN | DIASTOLIC BLOOD PRESSURE: 70 MMHG | BODY MASS INDEX: 27.46 KG/M2 | TEMPERATURE: 97.6 F | RESPIRATION RATE: 16 BRPM | WEIGHT: 155 LBS | SYSTOLIC BLOOD PRESSURE: 122 MMHG

## 2020-02-02 DIAGNOSIS — S99.921A INJURY OF TOE ON RIGHT FOOT, INITIAL ENCOUNTER: ICD-10-CM

## 2020-02-02 DIAGNOSIS — S92.511A CLOSED DISPLACED FRACTURE OF PROXIMAL PHALANX OF LESSER TOE OF RIGHT FOOT, INITIAL ENCOUNTER: ICD-10-CM

## 2020-02-02 PROCEDURE — 99214 OFFICE O/P EST MOD 30 MIN: CPT | Performed by: NURSE PRACTITIONER

## 2020-02-02 PROCEDURE — 73660 X-RAY EXAM OF TOE(S): CPT | Mod: TC,RT | Performed by: NURSE PRACTITIONER

## 2020-02-02 RX ORDER — ETONOGESTREL/ETHINYL ESTRADIOL .12-.015MG
RING, VAGINAL VAGINAL
COMMUNITY
Start: 2019-12-02

## 2020-02-02 ASSESSMENT — ENCOUNTER SYMPTOMS
TINGLING: 1
CHILLS: 0
SHORTNESS OF BREATH: 0
FEVER: 0

## 2020-02-02 NOTE — PROGRESS NOTES
"Subjective:   Josee Crowell is a 24 y.o. female who presents for Toe Injury (Rt foot, pinky toe, ran into wall X today)        HPI   Patient with new onset right little toe injury that occurred today. States she accidentally ran into a wall. States with mild tingling. Pain is moderate and worsened with walking. Has not taken anything for pain relief. Pain does not radiate.    Review of Systems   Constitutional: Negative for chills and fever.   Respiratory: Negative for shortness of breath.    Cardiovascular: Negative for chest pain.   Musculoskeletal: Positive for joint pain (Right little toe).   Skin: Negative for itching and rash.   Neurological: Positive for tingling.     Patient's PMH, SocHx, SurgHx, FamHx, Drug allergies and medications reviewed.     Objective:   /70   Pulse 74   Temp 36.4 °C (97.6 °F)   Resp 16   Ht 1.6 m (5' 3\")   Wt 70.3 kg (155 lb)   SpO2 96%   BMI 27.46 kg/m²   Physical Exam  Constitutional:       General: She is not in acute distress.     Appearance: She is well-developed. She is not diaphoretic.   HENT:      Head: Normocephalic.      Right Ear: Hearing normal.      Left Ear: Hearing normal.      Nose: Nose normal.   Eyes:      General: Lids are normal.      Conjunctiva/sclera: Conjunctivae normal.      Pupils: Pupils are equal, round, and reactive to light.   Neck:      Musculoskeletal: Normal range of motion.   Cardiovascular:      Rate and Rhythm: Normal rate and regular rhythm.      Heart sounds: Normal heart sounds.   Pulmonary:      Effort: Pulmonary effort is normal. No respiratory distress.      Breath sounds: Normal breath sounds. No decreased breath sounds or wheezing.   Musculoskeletal:      Right foot: Normal range of motion. Tenderness and swelling present.        Feet:       Comments: Mild swelling and tenderness to the right little toe. ROM normal   Skin:     General: Skin is warm.      Findings: No abrasion, bruising, erythema or rash.   Neurological:    " "  Mental Status: She is alert.   Psychiatric:         Mood and Affect: Mood normal.         Speech: Speech normal.         Behavior: Behavior normal.         Thought Content: Thought content normal.         Judgment: Judgment normal.           Assessment/Plan:   Assessment    1. Injury of toe on right foot, initial encounter  DX-TOE(S) 2+ RIGHT    REFERRAL TO ORTHOPEDICS   2. Closed displaced fracture of proximal phalanx of lesser toe of right foot, initial encounter  DX-TOE(S) 2+ RIGHT    REFERRAL TO ORTHOPEDICS     Xray:\"Fracture of the fifth proximal phalanx with some angulation and displacement.\" I have personally reviewed xray and agree with Radiologist's interpretation.  Patient given air boot and crutches in office. Discussed non weight bearing status  Rest and elevate the affected area with pillows.  You may apply ice packs to the affected area up to 4 times daily for 30 minutes at a time  You may apply compression to the affected area by wrapping with an ace bandage  May take over-the-counter Ibuprofen 600-800 mg every 8 hours as needed for pain  Follow up Ortho    Differential diagnosis, natural history, supportive care, and indications for immediate follow-up discussed.     **Please note that all invasive procedures during this visit were performed by myself and/or the Medical Assistant under the supervision of the PA or MD in office**      "

## 2020-09-25 ENCOUNTER — NON-PROVIDER VISIT (OUTPATIENT)
Dept: OCCUPATIONAL MEDICINE | Facility: CLINIC | Age: 25
End: 2020-09-25

## 2020-09-25 ENCOUNTER — HOSPITAL ENCOUNTER (OUTPATIENT)
Facility: MEDICAL CENTER | Age: 25
End: 2020-09-25
Attending: NURSE PRACTITIONER
Payer: COMMERCIAL

## 2020-09-25 DIAGNOSIS — Z02.89 VISIT FOR OCCUPATIONAL HEALTH EXAMINATION: Primary | ICD-10-CM

## 2020-09-25 DIAGNOSIS — Z02.89 VISIT FOR OCCUPATIONAL HEALTH EXAMINATION: ICD-10-CM

## 2020-09-25 PROCEDURE — U0003 INFECTIOUS AGENT DETECTION BY NUCLEIC ACID (DNA OR RNA); SEVERE ACUTE RESPIRATORY SYNDROME CORONAVIRUS 2 (SARS-COV-2) (CORONAVIRUS DISEASE [COVID-19]), AMPLIFIED PROBE TECHNIQUE, MAKING USE OF HIGH THROUGHPUT TECHNOLOGIES AS DESCRIBED BY CMS-2020-01-R: HCPCS | Performed by: NURSE PRACTITIONER

## 2020-09-28 LAB
GAMMA INTERFERON BACKGROUND BLD IA-ACNC: 0.02 IU/ML
M TB IFN-G BLD-IMP: NEGATIVE
M TB IFN-G CD4+ BCKGRND COR BLD-ACNC: 0 IU/ML
MITOGEN IGNF BCKGRD COR BLD-ACNC: >10 IU/ML
QFT TB2 - NIL TBQ2: 0 IU/ML

## 2021-01-11 ENCOUNTER — TELEPHONE (OUTPATIENT)
Dept: SCHEDULING | Facility: IMAGING CENTER | Age: 26
End: 2021-01-11

## 2021-03-01 ENCOUNTER — OFFICE VISIT (OUTPATIENT)
Dept: MEDICAL GROUP | Facility: PHYSICIAN GROUP | Age: 26
End: 2021-03-01
Payer: COMMERCIAL

## 2021-03-01 VITALS
OXYGEN SATURATION: 98 % | HEIGHT: 64 IN | SYSTOLIC BLOOD PRESSURE: 110 MMHG | BODY MASS INDEX: 28.85 KG/M2 | WEIGHT: 169 LBS | HEART RATE: 80 BPM | TEMPERATURE: 98.3 F | DIASTOLIC BLOOD PRESSURE: 78 MMHG

## 2021-03-01 DIAGNOSIS — Z83.438 FAMILY HISTORY OF ELEVATED BLOOD LIPIDS: ICD-10-CM

## 2021-03-01 DIAGNOSIS — Z82.49 FAMILY HISTORY OF ESSENTIAL HYPERTENSION: ICD-10-CM

## 2021-03-01 DIAGNOSIS — K59.09 OTHER CONSTIPATION: ICD-10-CM

## 2021-03-01 DIAGNOSIS — R21 SKIN RASH: ICD-10-CM

## 2021-03-01 DIAGNOSIS — K92.1: ICD-10-CM

## 2021-03-01 DIAGNOSIS — Z83.3 FAMILY HISTORY OF DIABETES MELLITUS (DM): ICD-10-CM

## 2021-03-01 PROCEDURE — 99214 OFFICE O/P EST MOD 30 MIN: CPT | Performed by: NURSE PRACTITIONER

## 2021-03-01 ASSESSMENT — PATIENT HEALTH QUESTIONNAIRE - PHQ9: CLINICAL INTERPRETATION OF PHQ2 SCORE: 0

## 2021-03-01 NOTE — ASSESSMENT & PLAN NOTE
This is a chronic condition.  She reports this has been an ongoing concern for the few years.  She reports that when she severely constipated and have a bowel movement she experiences some blood in her stool.  She reports daniel blood without any black, tarry stools.  She reports this only occurs with hard stools; does not occur when taking supplemental fiber or if she takes Miralax.  She reports that she has a family history for polyps (paternal grandfather) and family history for esophageal and stomach cancer (paternal aunt).  She denies any other abdominal concerns, including: abdominal pain, nausea/vomiting, bloating, abdominal distention.  She denies any blood in her urine or any genitourinary or vaginal concerns.

## 2021-03-01 NOTE — PATIENT INSTRUCTIONS
Constipation is a fairly common problem, and fortunately there are many good treatments available.  Listed below are things you can do to help with this issue.  I recommend you start with the first suggestion listed, and if that is not enough to help then keep working your way down the list until you get to the point where you are having normal bowel movements, then try backing off down the list to see if you can maintain normal bowel movements with something less aggressive.  1)  Increasing water intake to about 80 ounces a day (a bit more than a half gallon of water) can help.  In addition, regular exercise can make an enormous difference besides being generally good for you anyway.  Making sure your diet includes plenty of fruits and vegetables is also very helpful.  2)  Adding additional fiber to your diet with products like metamucil, benefiber, citrucel, or psyllium can help by adding bulk to your stool, keeping it from getting quite so packed down.  3) Polyethylene glycol (Miralax or its generic equivalent) is an osmotic laxative, meaning it brings extra water into your colon, helping keep your stool from getting hard and packed down.  One capful a day should give normal soft stool within about 2-3 days.  If not, consider trying 1 capful twice a day.  4) Milk of magnesia (30 ml daily) or magnesium citrate (1/2 to 1 bottle daily), or a bisacodyl (Dulcolax) suppository can be used next to get things moving.  5) If this has been ineffective, using Senna-S, 1-2 tablets one to two times daily can be helpful.  6) If all these measures have been ineffective, you can try a mineral oil enema or a warm tap water enema to see if this helps things.

## 2021-03-01 NOTE — PROGRESS NOTES
Subjective  Chief Complaint  Establish care to manage her chronic conditions    History of Present Illness  Josee Crowell is a 25 y.o. female. This patient is here today to establish care.  Her prior PCP was Dr. Shiva Benjamin MD.    Other constipation  This is a chronic condition.  She reports this has been an ongoing concern for the few years.  She reports that when she severely constipated and have a bowel movement she experiences some blood in her stool.  She reports daniel blood without any black, tarry stools.  She reports this only occurs with hard stools; does not occur when taking supplemental fiber or if she takes Miralax.  She reports that she has a family history for polyps (paternal grandfather) and family history for esophageal and stomach cancer (paternal aunt).  She denies any other abdominal concerns, including: abdominal pain, nausea/vomiting, bloating, abdominal distention.  She denies any blood in her urine or any genitourinary or vaginal concerns.    Skin rash  This is a chronic condition.  The skin rash has been present to her right posterior thigh for the last few years.  She reports that when the area is flared up, it is pruritic and erythematous; otherwise, the area appears dark and discolored.  She was previously prescribed ketoconazole cream, which she reports did not help the area.  She has not tried other OTC creams.  It is only bothersome to her with flare ups; otherwise, she has more of a cosmetic concern for the rash due to the darkness of the area.     Past Medical History    Allergies: Patient has no known allergies.  History reviewed. No pertinent past medical history.  Past Surgical History:   Procedure Laterality Date   • APPENDECTOMY LAPAROSCOPIC  5/10/2017    Procedure: APPENDECTOMY LAPAROSCOPIC;  Surgeon: Bonifacio Bacon M.D.;  Location: SURGERY [facility];  Service:      Current Outpatient Medications Ordered in Epic   Medication Sig Dispense Refill   • NUVARING  "0.12-0.015 MG/24HR vaginal ring INSERT 1 RING VAGINALLY FOR 3 WEEKS THEN REMOVE FOR 1 WEEK       No current Epic-ordered facility-administered medications on file.     Family History:    Family History   Problem Relation Age of Onset   • Hyperlipidemia Mother    • Diabetes Father    • Hypertension Father    • Diabetes Maternal Aunt    • Diabetes Maternal Uncle    • Cancer Paternal Aunt         esophageal, stomach   • Diabetes Paternal Aunt    • Diabetes Paternal Uncle    • Diabetes Maternal Grandmother    • Diabetes Maternal Grandfather    • Diabetes Paternal Grandmother    • Diabetes Paternal Grandfather    • Heart Disease Neg Hx    • Stroke Neg Hx       Personal/Social History:    Social History     Tobacco Use   • Smoking status: Never Smoker   • Smokeless tobacco: Never Used   Substance Use Topics   • Alcohol use: Yes     Comment: occ   • Drug use: No     Social History     Social History Narrative   • Not on file      Review of Systems:     General: Negative for fever/chills.    Eyes:  Negative for eye pain.   ENT:  Negative for ear pain.    Respiratory:  Negative for cough and dyspnea.     Cardiovascular:  Negative for chest pain and palpitations.   Gastrointestinal:  Positive for constipation with some blood in stool.    Genitourinary:  Negative for dysuria.    Musculoskeletal:  Negative for myalgias.    Skin:  Negative for rash.    Neurological:  Negative for numbness/tingling.    Heme/Lymph:  Does not bruise/bleed easily.     Objective  Physical Exam:   /78 (BP Location: Right arm, Patient Position: Sitting, BP Cuff Size: Adult)   Pulse 80   Temp 36.8 °C (98.3 °F) (Temporal)   Ht 1.613 m (5' 3.5\")   Wt 76.7 kg (169 lb)   SpO2 98%  Body mass index is 29.47 kg/m².  General:  Alert and oriented.  Well appearing.  NAD.  Head:  Normocephalic.   Eyes:  Eyes conjunctiva clear lids without ptosis, pupils equal and reactive to light accommodation.    ENT: Ears normal shape and contour, canals are clear " bilaterally, tympanic membranes are benign.  Oropharynx is without erythema, edema or exudates.   Neck: Supple without JVD. No lymphadenopathy.  Pulmonary:  Normal effort.  Clear to ausculation without rales, ronchi, or wheezing.  Cardiovascular:  Regular rate and rhythm without murmur, rubs or gallop.  Radial pulses are intact and equal bilaterally.  Gastrointestinal: Abdomen soft, nontender, nondistended. Normal bowel sounds. Liver and spleen are not palpable.  Musculoskeletal:  No extremity cyanosis, clubbing, or edema.  Skin:  Warm and dry.  No obvious lesions.  There is a skin rash to her right posterior thigh that does not appear erythematous or pruritic at this time.  There is some discoloration.  There are no open lesions.  Lymph: No cervical or supraclavicular lymph nodes are palpable.  Neurologic: Grossly intact.  DTRs 2+/3 bilaterally.  Sensation intact.   Psych: Normal mood and affect. Alert and oriented x3. Judgment and insight is normal.    Assessment/Plan   1. Other constipation  2. Blood in stool, daniel  This is a chronic condition, not controlled.  Will check stool for occult blood, CBC for any anemias.  The small amount of daniel blood that the patient reports may likely be due to constipation concerns.  Discussed with patient to take fiber daily to help with constipation.  Provided patient with constipation counseling, and provided additional information and after visit summary.  Dependent on lab results and stool sample, may need further evaluation with gastroenterology.  Signficant history for polyps and esophageal/stomach cancer.  - CBC WITHOUT DIFFERENTIAL; Future  - VITAMIN B12; Future  - IRON/TOTAL IRON BIND; Future  - FERRITIN; Future  - FOLATE; Future  - OCCULT BLOOD STOOL; Future    3. Skin rash  This is a chronic condition, not controlled.  Does not appear to be fungal in nature.  Likely due to atopic dermatitis.  Discussed with patient to try over-the-counter hydrocortisone cream, and to  apply to the affected area for up to 2 weeks.  Discussed to use fragrant free soaps and body lotion to allow the area to heal.  Should her symptoms worsen or not get better, she is to return to inform me and will likely send referral to dermatology or obtain skin biopsy in clinic.    4. Family history of diabetes mellitus (DM)  Significant family history for diabetes on both maternal and paternal side of the family.  - HEMOGLOBIN A1C; Future  - Basic Metabolic Panel; Future  - TSH WITH REFLEX TO FT4; Future  - VITAMIN D,25 HYDROXY; Future    5. Family history of essential hypertension  Significant family history for high blood pressure (father).  - Lipid Profile; Future  - CBC WITHOUT DIFFERENTIAL; Future  - VITAMIN D,25 HYDROXY; Future    6. Family history of elevated blood lipids  Significant family history for hyperlipidemia (mother).  She is having difficulty losing weight despite living an active lifestyle and monitoring her dietary intake.  - Lipid Profile; Future  - Basic Metabolic Panel; Future    7. BMI 29.0-29.9,adult  This is a chronic condition, not controlled.  She is having difficulty losing weight despite living an active lifestyle and monitoring her dietary intake.  She is interested in attending health improvement program to help with weight loss.  Dietary and lifestyle counseling provided, including: Decrease intake of saturated fat, increasing soluble fiber intake, and engaging in start physical activity at least 3 times a week for minimum of 30 minutes a day.  - REFERRAL TO [facility] HEALTH IMPROVEMENT PROGRAMS (HIP)    Health Maintenance: Completed  - Last pap around 2018-follows up with Dr. Rivers for routine well woman visits-will request records.    Return in about 1 year (around 3/1/2022), or if symptoms worsen or fail to improve, for Annual physical exam.    Patient verbalized understanding and agreed to plan of care.  We have discussed to contact me with needs via [employer]3[facility] or by phone if  needed.      I have placed the above orders and discussed them with an approved delegating provider.  The MA is performing the below orders under the direction of Dr. Dandy DO.     My total time spent caring for the patient on the day of the encounter was 38 minutes.  This does not include time spent on separately billable procedures/tests.    Please note that this dictation was created using voice recognition software. I have made every reasonable attempt to correct obvious errors, but I expect that there are errors of grammar and possibly content that I did not discover before finalizing the note.    XIN Wyman  [facility]

## 2021-03-01 NOTE — ASSESSMENT & PLAN NOTE
This is a chronic condition.  The skin rash has been present to her right posterior thigh for the last few years.  She reports that when the area is flared up, it is pruritic and erythematous; otherwise, the area appears dark and discolored.  She was previously prescribed ketoconazole cream, which she reports did not help the area.  She has not tried other OTC creams.  It is only bothersome to her with flare ups; otherwise, she has more of a cosmetic concern for the rash due to the darkness of the area.

## 2021-03-08 ENCOUNTER — HOSPITAL ENCOUNTER (OUTPATIENT)
Dept: LAB | Facility: MEDICAL CENTER | Age: 26
End: 2021-03-08
Attending: NURSE PRACTITIONER
Payer: COMMERCIAL

## 2021-03-08 DIAGNOSIS — Z83.3 FAMILY HISTORY OF DIABETES MELLITUS (DM): ICD-10-CM

## 2021-03-08 DIAGNOSIS — Z82.49 FAMILY HISTORY OF ESSENTIAL HYPERTENSION: ICD-10-CM

## 2021-03-08 DIAGNOSIS — K92.1: ICD-10-CM

## 2021-03-08 DIAGNOSIS — K59.09 OTHER CONSTIPATION: ICD-10-CM

## 2021-03-08 DIAGNOSIS — Z83.438 FAMILY HISTORY OF ELEVATED BLOOD LIPIDS: ICD-10-CM

## 2021-03-08 LAB
25(OH)D3 SERPL-MCNC: 21 NG/ML (ref 30–100)
ANION GAP SERPL CALC-SCNC: 14 MMOL/L (ref 7–16)
BUN SERPL-MCNC: 10 MG/DL (ref 8–22)
CALCIUM SERPL-MCNC: 10.2 MG/DL (ref 8.5–10.5)
CHLORIDE SERPL-SCNC: 99 MMOL/L (ref 96–112)
CHOLEST SERPL-MCNC: 207 MG/DL (ref 100–199)
CO2 SERPL-SCNC: 22 MMOL/L (ref 20–33)
CREAT SERPL-MCNC: 0.68 MG/DL (ref 0.5–1.4)
ERYTHROCYTE [DISTWIDTH] IN BLOOD BY AUTOMATED COUNT: 43 FL (ref 35.9–50)
EST. AVERAGE GLUCOSE BLD GHB EST-MCNC: 108 MG/DL
FERRITIN SERPL-MCNC: 200 NG/ML (ref 10–291)
FOLATE SERPL-MCNC: 10.6 NG/ML
GLUCOSE SERPL-MCNC: 89 MG/DL (ref 65–99)
HBA1C MFR BLD: 5.4 % (ref 4–5.6)
HCT VFR BLD AUTO: 44.5 % (ref 37–47)
HDLC SERPL-MCNC: 56 MG/DL
HEMOCCULT STL QL: NEGATIVE
HGB BLD-MCNC: 14.6 G/DL (ref 12–16)
IRON SATN MFR SERPL: 35 % (ref 15–55)
IRON SERPL-MCNC: 123 UG/DL (ref 40–170)
LDLC SERPL CALC-MCNC: 124 MG/DL
MCH RBC QN AUTO: 31.2 PG (ref 27–33)
MCHC RBC AUTO-ENTMCNC: 32.8 G/DL (ref 33.6–35)
MCV RBC AUTO: 95.1 FL (ref 81.4–97.8)
PLATELET # BLD AUTO: 312 K/UL (ref 164–446)
PMV BLD AUTO: 9.9 FL (ref 9–12.9)
POTASSIUM SERPL-SCNC: 4.1 MMOL/L (ref 3.6–5.5)
RBC # BLD AUTO: 4.68 M/UL (ref 4.2–5.4)
SODIUM SERPL-SCNC: 135 MMOL/L (ref 135–145)
TIBC SERPL-MCNC: 354 UG/DL (ref 250–450)
TRIGL SERPL-MCNC: 137 MG/DL (ref 0–149)
TSH SERPL DL<=0.005 MIU/L-ACNC: 2.33 UIU/ML (ref 0.38–5.33)
UIBC SERPL-MCNC: 231 UG/DL (ref 110–370)
VIT B12 SERPL-MCNC: 439 PG/ML (ref 211–911)
WBC # BLD AUTO: 5.9 K/UL (ref 4.8–10.8)

## 2021-03-08 PROCEDURE — 83036 HEMOGLOBIN GLYCOSYLATED A1C: CPT

## 2021-03-08 PROCEDURE — 84443 ASSAY THYROID STIM HORMONE: CPT

## 2021-03-08 PROCEDURE — 82272 OCCULT BLD FECES 1-3 TESTS: CPT

## 2021-03-08 PROCEDURE — 82607 VITAMIN B-12: CPT

## 2021-03-08 PROCEDURE — 80048 BASIC METABOLIC PNL TOTAL CA: CPT

## 2021-03-08 PROCEDURE — 82746 ASSAY OF FOLIC ACID SERUM: CPT

## 2021-03-08 PROCEDURE — 80061 LIPID PANEL: CPT

## 2021-03-08 PROCEDURE — 83540 ASSAY OF IRON: CPT

## 2021-03-08 PROCEDURE — 83550 IRON BINDING TEST: CPT

## 2021-03-08 PROCEDURE — 82728 ASSAY OF FERRITIN: CPT

## 2021-03-08 PROCEDURE — 36415 COLL VENOUS BLD VENIPUNCTURE: CPT

## 2021-03-08 PROCEDURE — 85027 COMPLETE CBC AUTOMATED: CPT

## 2021-03-08 PROCEDURE — 82306 VITAMIN D 25 HYDROXY: CPT

## 2021-08-31 ENCOUNTER — OFFICE VISIT (OUTPATIENT)
Dept: URGENT CARE | Facility: PHYSICIAN GROUP | Age: 26
End: 2021-08-31
Payer: COMMERCIAL

## 2021-08-31 VITALS
DIASTOLIC BLOOD PRESSURE: 58 MMHG | HEART RATE: 100 BPM | RESPIRATION RATE: 16 BRPM | SYSTOLIC BLOOD PRESSURE: 108 MMHG | OXYGEN SATURATION: 98 % | HEIGHT: 64 IN | TEMPERATURE: 98.4 F | BODY MASS INDEX: 30.11 KG/M2 | WEIGHT: 176.4 LBS

## 2021-08-31 DIAGNOSIS — Z88.9 H/O SEASONAL ALLERGIES: ICD-10-CM

## 2021-08-31 DIAGNOSIS — R09.81 NASAL CONGESTION WITH RHINORRHEA: ICD-10-CM

## 2021-08-31 DIAGNOSIS — J34.89 NASAL CONGESTION WITH RHINORRHEA: ICD-10-CM

## 2021-08-31 PROCEDURE — 99213 OFFICE O/P EST LOW 20 MIN: CPT | Performed by: NURSE PRACTITIONER

## 2021-08-31 ASSESSMENT — ENCOUNTER SYMPTOMS
NECK PAIN: 0
EYE DISCHARGE: 0
DIARRHEA: 0
CHILLS: 0
WHEEZING: 0
COUGH: 0
MYALGIAS: 0
NAUSEA: 0
HEADACHES: 1
SHORTNESS OF BREATH: 0
SORE THROAT: 0
EYE REDNESS: 0
WEAKNESS: 0
DIZZINESS: 0
VOMITING: 0
CONSTIPATION: 0
ABDOMINAL PAIN: 0
FEVER: 0

## 2021-08-31 NOTE — PROGRESS NOTES
Subjective     Josee Crowell is a 25 y.o. female who presents with Congestion (nasal congesiton, headheache, bilateral ear fullness, onset yesterday afternoon  )            HPI  Nasal congestion, sinus headache, ear fullness since yesterday. Denies fever, nausea/vomiting, dizziness, sore throat, cough, body aches, fatigue. History of seasonal allergies, smoke has been bothering her. Denies exposure to COVID or others with illness. No history of COVID in past but has been vaccinated. Flonase x 3 days. No saline rinse.     PMH:  has no past medical history on file.  MEDS:   Current Outpatient Medications:   •  NUVARING 0.12-0.015 MG/24HR vaginal ring, INSERT 1 RING VAGINALLY FOR 3 WEEKS THEN REMOVE FOR 1 WEEK, Disp: , Rfl:   ALLERGIES: No Known Allergies  SURGHX:   Past Surgical History:   Procedure Laterality Date   • APPENDECTOMY LAPAROSCOPIC  5/10/2017    Procedure: APPENDECTOMY LAPAROSCOPIC;  Surgeon: Bonifacio Bacon M.D.;  Location: SURGERY [facility];  Service:      SOCHX:  reports that she has never smoked. She has never used smokeless tobacco. She reports current alcohol use. She reports that she does not use drugs.  FH: Family history was reviewed, no pertinent findings to report    Review of Systems   Constitutional: Negative for chills, fever and malaise/fatigue.   HENT: Positive for congestion and ear pain. Negative for sore throat.    Eyes: Negative for discharge and redness.   Respiratory: Negative for cough, shortness of breath and wheezing.    Gastrointestinal: Negative for abdominal pain, constipation, diarrhea, nausea and vomiting.   Musculoskeletal: Negative for myalgias and neck pain.   Skin: Negative for itching and rash.   Neurological: Positive for headaches. Negative for dizziness and weakness.   Endo/Heme/Allergies: Positive for environmental allergies.   All other systems reviewed and are negative.             Objective     /58 (BP Location: Right arm, Patient Position:  "Sitting, BP Cuff Size: Adult)   Pulse 100   Temp 36.9 °C (98.4 °F) (Temporal)   Resp 16   Ht 1.613 m (5' 3.5\")   Wt 80 kg (176 lb 6.4 oz)   SpO2 98%   BMI 30.76 kg/m²      Physical Exam  Vitals reviewed.   Constitutional:       General: She is awake. She is not in acute distress.     Appearance: Normal appearance. She is well-developed. She is not ill-appearing, toxic-appearing or diaphoretic.   HENT:      Head: Normocephalic.      Right Ear: A middle ear effusion is present.      Left Ear: A middle ear effusion is present.      Nose: Mucosal edema, congestion and rhinorrhea present.      Mouth/Throat:      Lips: Pink.      Mouth: Mucous membranes are dry.      Pharynx: Oropharynx is clear. Uvula midline.   Eyes:      Conjunctiva/sclera: Conjunctivae normal.      Pupils: Pupils are equal, round, and reactive to light.   Cardiovascular:      Rate and Rhythm: Normal rate.   Pulmonary:      Effort: Pulmonary effort is normal.   Musculoskeletal:         General: Normal range of motion.      Cervical back: Normal range of motion and neck supple.   Skin:     General: Skin is warm and dry.   Neurological:      Mental Status: She is alert and oriented to person, place, and time.   Psychiatric:         Attention and Perception: Attention normal.         Mood and Affect: Mood normal.         Speech: Speech normal.         Behavior: Behavior normal. Behavior is cooperative.                             Assessment & Plan                 1. Nasal congestion with rhinorrhea      2. H/O seasonal allergies    -Increase water intake  -Get rest  -May use Ibuprofen/Tylenol as needed for any fever, body aches or throat pain  -May take longer acting antihistamine for seasonal allergy symptoms (chose one like Claritin/Zyrtec/Allegra without decongestant) x 1 week then as needed   -May use over the counter saline nasal spray for nasal congestion up to 4x/day  -May use over the counter Flonase or Nasocort for allergen nasal " congestion as needed x 1 week then as needed   -May gargle with salt water as needed for throat discomfort up to 4x/day (1 tsp salt in one cup warm water)  -May drink smoothies for nutrition if too painful to swallow solid foods  -Monitor for worsening or persistent symptoms, increased facial pressure, dizziness, fever, productive cough, shortness of breath and chest pain/tightness- need re-evaluation

## 2021-09-17 ENCOUNTER — HOSPITAL ENCOUNTER (OUTPATIENT)
Facility: MEDICAL CENTER | Age: 26
End: 2021-09-17
Attending: FAMILY MEDICINE
Payer: COMMERCIAL

## 2021-09-17 ENCOUNTER — NON-PROVIDER VISIT (OUTPATIENT)
Dept: OCCUPATIONAL MEDICINE | Facility: CLINIC | Age: 26
End: 2021-09-17
Payer: COMMERCIAL

## 2021-09-17 DIAGNOSIS — Z02.89 ENCOUNTER FOR OCCUPATIONAL HEALTH ASSESSMENT: ICD-10-CM

## 2021-09-17 PROCEDURE — 86480 TB TEST CELL IMMUN MEASURE: CPT | Performed by: FAMILY MEDICINE

## 2021-09-20 LAB
GAMMA INTERFERON BACKGROUND BLD IA-ACNC: 0.07 IU/ML
M TB IFN-G BLD-IMP: NEGATIVE
M TB IFN-G CD4+ BCKGRND COR BLD-ACNC: -0.01 IU/ML
MITOGEN IGNF BCKGRD COR BLD-ACNC: >10 IU/ML
QFT TB2 - NIL TBQ2: -0.03 IU/ML

## 2023-03-27 ENCOUNTER — OFFICE VISIT (OUTPATIENT)
Dept: URGENT CARE | Facility: PHYSICIAN GROUP | Age: 28
End: 2023-03-27
Payer: COMMERCIAL

## 2023-03-27 ENCOUNTER — HOSPITAL ENCOUNTER (OUTPATIENT)
Facility: MEDICAL CENTER | Age: 28
End: 2023-03-27
Payer: COMMERCIAL

## 2023-03-27 VITALS
SYSTOLIC BLOOD PRESSURE: 104 MMHG | OXYGEN SATURATION: 96 % | WEIGHT: 183 LBS | TEMPERATURE: 97.2 F | RESPIRATION RATE: 16 BRPM | HEART RATE: 73 BPM | HEIGHT: 63 IN | BODY MASS INDEX: 32.43 KG/M2 | DIASTOLIC BLOOD PRESSURE: 72 MMHG

## 2023-03-27 DIAGNOSIS — N89.8 VAGINAL ODOR: ICD-10-CM

## 2023-03-27 DIAGNOSIS — R30.0 DYSURIA: ICD-10-CM

## 2023-03-27 DIAGNOSIS — N30.01 ACUTE CYSTITIS WITH HEMATURIA: ICD-10-CM

## 2023-03-27 LAB
APPEARANCE UR: CLEAR
BILIRUB UR STRIP-MCNC: NEGATIVE MG/DL
COLOR UR AUTO: YELLOW
GLUCOSE UR STRIP.AUTO-MCNC: NEGATIVE MG/DL
KETONES UR STRIP.AUTO-MCNC: NEGATIVE MG/DL
LEUKOCYTE ESTERASE UR QL STRIP.AUTO: NORMAL
NITRITE UR QL STRIP.AUTO: NEGATIVE
PH UR STRIP.AUTO: 5.5 [PH] (ref 5–8)
POCT INT CON NEG: NEGATIVE
POCT INT CON POS: POSITIVE
POCT URINE PREGNANCY TEST: NEGATIVE
PROT UR QL STRIP: NEGATIVE MG/DL
RBC UR QL AUTO: NORMAL
SP GR UR STRIP.AUTO: 1.02
UROBILINOGEN UR STRIP-MCNC: 0.2 MG/DL

## 2023-03-27 PROCEDURE — 99213 OFFICE O/P EST LOW 20 MIN: CPT

## 2023-03-27 PROCEDURE — 87510 GARDNER VAG DNA DIR PROBE: CPT

## 2023-03-27 PROCEDURE — 81025 URINE PREGNANCY TEST: CPT

## 2023-03-27 PROCEDURE — 87480 CANDIDA DNA DIR PROBE: CPT

## 2023-03-27 PROCEDURE — 87660 TRICHOMONAS VAGIN DIR PROBE: CPT

## 2023-03-27 PROCEDURE — 81002 URINALYSIS NONAUTO W/O SCOPE: CPT

## 2023-03-27 RX ORDER — METRONIDAZOLE 500 MG/1
500 TABLET ORAL 2 TIMES DAILY
Qty: 14 TABLET | Refills: 0 | Status: SHIPPED | OUTPATIENT
Start: 2023-03-27 | End: 2023-04-03

## 2023-03-27 RX ORDER — NITROFURANTOIN 25; 75 MG/1; MG/1
100 CAPSULE ORAL EVERY 12 HOURS
Qty: 10 CAPSULE | Refills: 0 | Status: SHIPPED | OUTPATIENT
Start: 2023-03-27 | End: 2023-04-01

## 2023-03-27 ASSESSMENT — ENCOUNTER SYMPTOMS
MYALGIAS: 0
FEVER: 0
SHORTNESS OF BREATH: 0
NAUSEA: 0
VOMITING: 0
CHILLS: 0
FLANK PAIN: 0

## 2023-03-27 NOTE — PROGRESS NOTES
Subjective:     CHIEF COMPLAINT  Chief Complaint   Patient presents with    Urinary Frequency     X1 day    Other     Vaginal odor/burning x1 day       HPI  Josee Go is a very pleasant 27 y.o. female who presents with pain with urination and increased frequency for one day. She also has noticed a fishy vaginal odor that is not typical for her. She denies any new sexual partners of concern for STDs at this time. Otherwise feels well. No back pain, body aches, chills.     REVIEW OF SYSTEMS  Review of Systems   Constitutional:  Negative for chills, fever and malaise/fatigue.   Respiratory:  Negative for shortness of breath.    Cardiovascular:  Negative for chest pain.   Gastrointestinal:  Negative for nausea and vomiting.   Genitourinary:  Positive for dysuria, frequency and urgency. Negative for flank pain.   Musculoskeletal:  Negative for myalgias.     PAST MEDICAL HISTORY  Patient Active Problem List    Diagnosis Date Noted    Other constipation 03/01/2021    Skin rash 03/01/2021       SURGICAL HISTORY   has a past surgical history that includes appendectomy laparoscopic (5/10/2017).    ALLERGIES  No Known Allergies    CURRENT MEDICATIONS  Home Medications       Reviewed by Donna Fernandez P.A.-C. (Physician Assistant) on 03/27/23 at 0854  Med List Status: <None>     Medication Last Dose Status   NUVARING 0.12-0.015 MG/24HR vaginal ring Taking Active                    SOCIAL HISTORY  Social History     Tobacco Use    Smoking status: Never    Smokeless tobacco: Never   Vaping Use    Vaping Use: Never used   Substance and Sexual Activity    Alcohol use: Yes     Comment: occ    Drug use: No    Sexual activity: Yes     Partners: Male     Birth control/protection: Ring       FAMILY HISTORY  Family History   Problem Relation Age of Onset    Hyperlipidemia Mother     Diabetes Father     Hypertension Father     Diabetes Maternal Aunt     Diabetes Maternal Uncle     Cancer Paternal Aunt         esophageal, stomach  "   Diabetes Paternal Aunt     Diabetes Paternal Uncle     Diabetes Maternal Grandmother     Diabetes Maternal Grandfather     Diabetes Paternal Grandmother     Diabetes Paternal Grandfather     Heart Disease Neg Hx     Stroke Neg Hx           Objective:     VITAL SIGNS: /72 (BP Location: Right arm, Patient Position: Sitting, BP Cuff Size: Adult)   Pulse 73   Temp 36.2 °C (97.2 °F) (Temporal)   Resp 16   Ht 1.6 m (5' 3\")   Wt 83 kg (183 lb)   SpO2 96%   BMI 32.42 kg/m²     PHYSICAL EXAM  Physical Exam  Constitutional:       General: She is not in acute distress.     Appearance: Normal appearance.   HENT:      Head: Normocephalic and atraumatic.   Eyes:      Conjunctiva/sclera: Conjunctivae normal.   Cardiovascular:      Rate and Rhythm: Normal rate and regular rhythm.      Heart sounds: Normal heart sounds.   Pulmonary:      Effort: Pulmonary effort is normal.      Breath sounds: Normal breath sounds. No wheezing, rhonchi or rales.   Abdominal:      General: Abdomen is flat.      Tenderness: There is no right CVA tenderness or left CVA tenderness.   Skin:     General: Skin is warm and dry.   Neurological:      General: No focal deficit present.      Mental Status: She is alert and oriented to person, place, and time.   Psychiatric:         Mood and Affect: Mood normal.       Assessment/Plan:     1. Dysuria  - POCT Urinalysis  - POCT Pregnancy    2. Vaginal odor  - VAGINAL PATHOGENS DNA PANEL; Future  - metroNIDAZOLE (FLAGYL) 500 MG Tab; Take 1 Tablet by mouth 2 times a day for 7 days.  Dispense: 14 Tablet; Refill: 0    3. Acute cystitis with hematuria  - nitrofurantoin (MACROBID) 100 MG Cap; Take 1 Capsule by mouth every 12 hours for 5 days.  Dispense: 10 Capsule; Refill: 0  - Referral back to [facility] PCP      MDM/Comments:  Patient has stable vital signs and is non-toxic appearing. Vaginal pathogens swab performed. Patient's UA positive for leukocytes and blood. Prescribed Nitrofurantoin for 5 days. No " CVA tenderness, chills or body aches. No current concern for kidney infection. Discussed avoidance of alcohol, reducing caffeine, and urinating after intercourse. Treating empirically for BV with 7 days of Metronidazole. Patient educated on avoidance of alcohol during treatment.     Differential diagnosis, natural history, supportive care, and indications for immediate follow-up discussed. All questions answered. Patient agrees with the plan of care.    Follow-up as needed if symptoms worsen or fail to improve to PCP, Urgent care or Emergency Room.    I have personally reviewed prior external notes and test results pertinent to today's visit.  I have independently reviewed and interpreted all diagnostics ordered during this urgent care acute visit.   Discussed management options (risks,benefits, and alternatives to treatment). Pt expresses understanding and the treatment plan was agreed upon. Questions were encouraged and answered to pt's satisfaction.    Please note that this dictation was created using voice recognition software. I have made a reasonable attempt to correct obvious errors, but I expect that there are errors of grammar and possibly content that I did not discover before finalizing the note.

## 2023-10-09 ENCOUNTER — OFFICE VISIT (OUTPATIENT)
Dept: MEDICAL GROUP | Facility: MEDICAL CENTER | Age: 28
End: 2023-10-09
Payer: COMMERCIAL

## 2023-10-09 ENCOUNTER — HOSPITAL ENCOUNTER (OUTPATIENT)
Dept: LAB | Facility: MEDICAL CENTER | Age: 28
End: 2023-10-09
Attending: STUDENT IN AN ORGANIZED HEALTH CARE EDUCATION/TRAINING PROGRAM
Payer: COMMERCIAL

## 2023-10-09 VITALS
WEIGHT: 184 LBS | DIASTOLIC BLOOD PRESSURE: 62 MMHG | OXYGEN SATURATION: 97 % | RESPIRATION RATE: 18 BRPM | TEMPERATURE: 96.1 F | SYSTOLIC BLOOD PRESSURE: 108 MMHG | BODY MASS INDEX: 32.6 KG/M2 | HEART RATE: 72 BPM | HEIGHT: 63 IN

## 2023-10-09 DIAGNOSIS — B35.3 TINEA PEDIS OF BOTH FEET: ICD-10-CM

## 2023-10-09 DIAGNOSIS — E66.9 OBESITY (BMI 30-39.9): ICD-10-CM

## 2023-10-09 DIAGNOSIS — B35.1 ONYCHOMYCOSIS: ICD-10-CM

## 2023-10-09 DIAGNOSIS — E78.5 DYSLIPIDEMIA: ICD-10-CM

## 2023-10-09 DIAGNOSIS — Z83.3 FAMILY HISTORY OF DIABETES MELLITUS: ICD-10-CM

## 2023-10-09 LAB
ALBUMIN SERPL BCP-MCNC: 4.5 G/DL (ref 3.2–4.9)
ALBUMIN/GLOB SERPL: 1.6 G/DL
ALP SERPL-CCNC: 57 U/L (ref 30–99)
ALT SERPL-CCNC: 40 U/L (ref 2–50)
ANION GAP SERPL CALC-SCNC: 12 MMOL/L (ref 7–16)
AST SERPL-CCNC: 21 U/L (ref 12–45)
BILIRUB SERPL-MCNC: 0.5 MG/DL (ref 0.1–1.5)
BUN SERPL-MCNC: 9 MG/DL (ref 8–22)
CALCIUM ALBUM COR SERPL-MCNC: 9 MG/DL (ref 8.5–10.5)
CALCIUM SERPL-MCNC: 9.4 MG/DL (ref 8.5–10.5)
CHLORIDE SERPL-SCNC: 101 MMOL/L (ref 96–112)
CHOLEST SERPL-MCNC: 168 MG/DL (ref 100–199)
CO2 SERPL-SCNC: 24 MMOL/L (ref 20–33)
CREAT SERPL-MCNC: 0.78 MG/DL (ref 0.5–1.4)
ERYTHROCYTE [DISTWIDTH] IN BLOOD BY AUTOMATED COUNT: 43.9 FL (ref 35.9–50)
EST. AVERAGE GLUCOSE BLD GHB EST-MCNC: 108 MG/DL
FASTING STATUS PATIENT QL REPORTED: NORMAL
GFR SERPLBLD CREATININE-BSD FMLA CKD-EPI: 107 ML/MIN/1.73 M 2
GLOBULIN SER CALC-MCNC: 2.9 G/DL (ref 1.9–3.5)
GLUCOSE SERPL-MCNC: 99 MG/DL (ref 65–99)
HBA1C MFR BLD: 5.4 % (ref 4–5.6)
HCT VFR BLD AUTO: 42.7 % (ref 37–47)
HDLC SERPL-MCNC: 45 MG/DL
HGB BLD-MCNC: 13.9 G/DL (ref 12–16)
LDLC SERPL CALC-MCNC: 88 MG/DL
MCH RBC QN AUTO: 30.8 PG (ref 27–33)
MCHC RBC AUTO-ENTMCNC: 32.6 G/DL (ref 32.2–35.5)
MCV RBC AUTO: 94.7 FL (ref 81.4–97.8)
PLATELET # BLD AUTO: 322 K/UL (ref 164–446)
PMV BLD AUTO: 9.8 FL (ref 9–12.9)
POTASSIUM SERPL-SCNC: 4.3 MMOL/L (ref 3.6–5.5)
PROT SERPL-MCNC: 7.4 G/DL (ref 6–8.2)
RBC # BLD AUTO: 4.51 M/UL (ref 4.2–5.4)
SODIUM SERPL-SCNC: 137 MMOL/L (ref 135–145)
TRIGL SERPL-MCNC: 176 MG/DL (ref 0–149)
TSH SERPL DL<=0.005 MIU/L-ACNC: 2.31 UIU/ML (ref 0.38–5.33)
WBC # BLD AUTO: 6.7 K/UL (ref 4.8–10.8)

## 2023-10-09 PROCEDURE — 84443 ASSAY THYROID STIM HORMONE: CPT

## 2023-10-09 PROCEDURE — 36415 COLL VENOUS BLD VENIPUNCTURE: CPT

## 2023-10-09 PROCEDURE — 83036 HEMOGLOBIN GLYCOSYLATED A1C: CPT

## 2023-10-09 PROCEDURE — 3074F SYST BP LT 130 MM HG: CPT | Performed by: STUDENT IN AN ORGANIZED HEALTH CARE EDUCATION/TRAINING PROGRAM

## 2023-10-09 PROCEDURE — 3078F DIAST BP <80 MM HG: CPT | Performed by: STUDENT IN AN ORGANIZED HEALTH CARE EDUCATION/TRAINING PROGRAM

## 2023-10-09 PROCEDURE — 80053 COMPREHEN METABOLIC PANEL: CPT

## 2023-10-09 PROCEDURE — 80061 LIPID PANEL: CPT

## 2023-10-09 PROCEDURE — 85027 COMPLETE CBC AUTOMATED: CPT

## 2023-10-09 PROCEDURE — 99214 OFFICE O/P EST MOD 30 MIN: CPT | Performed by: STUDENT IN AN ORGANIZED HEALTH CARE EDUCATION/TRAINING PROGRAM

## 2023-10-09 RX ORDER — TERBINAFINE HYDROCHLORIDE 250 MG/1
250 TABLET ORAL DAILY
Qty: 90 TABLET | Refills: 0 | Status: SHIPPED | OUTPATIENT
Start: 2023-10-09 | End: 2024-01-07

## 2023-10-09 ASSESSMENT — ENCOUNTER SYMPTOMS
DIZZINESS: 0
HEADACHES: 0
WHEEZING: 0
VOMITING: 0
SHORTNESS OF BREATH: 0
WEIGHT LOSS: 0
CHILLS: 0
PALPITATIONS: 0
FEVER: 0
NAUSEA: 0

## 2023-10-09 ASSESSMENT — PATIENT HEALTH QUESTIONNAIRE - PHQ9: CLINICAL INTERPRETATION OF PHQ2 SCORE: 0

## 2023-10-09 NOTE — LETTER
Adlyfe  FAN Wyman  [address]  [place] [address]  Fax: [phone]   Authorization for Release/Disclosure of   Protected Health Information   Name: JOSEE GO : 1995 SSN: [id number]   Address: [address] [place] [address]  [place] [address] Phone:    [phone] (home)    I authorize the entity listed below to release/disclose the PHI below to:   Adlyfe/FAN Wyman and Hieu Slaughter M.D.   Provider or Entity Name:  [facility]   Address   City, [facility], [facility]   Phone:      Fax:     Reason for request: continuity of care   Information to be released:    [  ] LAST COLONOSCOPY,  including any PATH REPORT and follow-up  [  ] LAST FIT/COLOGUARD RESULT [  ] LAST DEXA  [  ] LAST MAMMOGRAM  [x  ] LAST PAP  [  ] LAST LABS [  ] RETINA EXAM REPORT  [  ] IMMUNIZATION RECORDS  [  ] Release all info      [  ] Check here and initial the line next to each item to release ALL health information INCLUDING  _____ Care and treatment for drug and / or alcohol abuse  _____ HIV testing, infection status, or AIDS  _____ Genetic Testing    DATES OF SERVICE OR TIME PERIOD TO BE DISCLOSED: _____________  I understand and acknowledge that:  * This Authorization may be revoked at any time by you in writing, except if your health information has already been used or disclosed.  * Your health information that will be used or disclosed as a result of you signing this authorization could be re-disclosed by the recipient. If this occurs, your re-disclosed health information may no longer be protected by State or Federal laws.  * You may refuse to sign this Authorization. Your refusal will not affect your ability to obtain treatment.  * This Authorization becomes effective upon signing and will  on (date) __________.      If no date is indicated, this Authorization will  one (1) year from the signature date.    Name: Josee Go  Signature: Date:   10/9/2023      PLEASE FAX REQUESTED RECORDS BACK TO: [phone]

## 2023-10-10 DIAGNOSIS — E66.9 OBESITY (BMI 30-39.9): ICD-10-CM

## 2023-11-13 ENCOUNTER — OFFICE VISIT (OUTPATIENT)
Dept: URGENT CARE | Facility: PHYSICIAN GROUP | Age: 28
End: 2023-11-13
Payer: COMMERCIAL

## 2023-11-13 VITALS
DIASTOLIC BLOOD PRESSURE: 68 MMHG | TEMPERATURE: 98.1 F | BODY MASS INDEX: 32.99 KG/M2 | HEIGHT: 63 IN | RESPIRATION RATE: 16 BRPM | WEIGHT: 186.2 LBS | HEART RATE: 82 BPM | OXYGEN SATURATION: 94 % | SYSTOLIC BLOOD PRESSURE: 110 MMHG

## 2023-11-13 DIAGNOSIS — R05.1 ACUTE COUGH: ICD-10-CM

## 2023-11-13 DIAGNOSIS — J02.9 SORE THROAT: ICD-10-CM

## 2023-11-13 DIAGNOSIS — H66.002 NON-RECURRENT ACUTE SUPPURATIVE OTITIS MEDIA OF LEFT EAR WITHOUT SPONTANEOUS RUPTURE OF TYMPANIC MEMBRANE: ICD-10-CM

## 2023-11-13 LAB
FLUAV RNA SPEC QL NAA+PROBE: NEGATIVE
FLUBV RNA SPEC QL NAA+PROBE: NEGATIVE
RSV RNA SPEC QL NAA+PROBE: NEGATIVE
S PYO DNA SPEC NAA+PROBE: NOT DETECTED
SARS-COV-2 RNA RESP QL NAA+PROBE: NEGATIVE

## 2023-11-13 PROCEDURE — 0241U POCT CEPHEID COV-2, FLU A/B, RSV - PCR: CPT | Performed by: PHYSICIAN ASSISTANT

## 2023-11-13 PROCEDURE — 3078F DIAST BP <80 MM HG: CPT | Performed by: PHYSICIAN ASSISTANT

## 2023-11-13 PROCEDURE — 87651 STREP A DNA AMP PROBE: CPT | Performed by: PHYSICIAN ASSISTANT

## 2023-11-13 PROCEDURE — 99213 OFFICE O/P EST LOW 20 MIN: CPT | Performed by: PHYSICIAN ASSISTANT

## 2023-11-13 PROCEDURE — 3074F SYST BP LT 130 MM HG: CPT | Performed by: PHYSICIAN ASSISTANT

## 2023-11-13 RX ORDER — AMOXICILLIN 875 MG/1
875 TABLET, COATED ORAL 2 TIMES DAILY
Qty: 14 TABLET | Refills: 0 | Status: SHIPPED | OUTPATIENT
Start: 2023-11-13 | End: 2023-11-20

## 2023-11-13 ASSESSMENT — ENCOUNTER SYMPTOMS
FEVER: 0
CHILLS: 0
SORE THROAT: 1

## 2023-11-13 ASSESSMENT — FIBROSIS 4 INDEX: FIB4 SCORE: 0.28

## 2023-11-13 NOTE — PROGRESS NOTES
"  Subjective:   Josee Go is a 28 y.o. female who presents today with   Chief Complaint   Patient presents with    Nasal Congestion     Sore throat, sinus pressure, R ear pain, onset yesterday      Pharyngitis   This is a new problem. The current episode started yesterday. The problem has been unchanged. Associated symptoms include ear pain. Associated symptoms comments: Sinus pressure.     PMH:  has no past medical history on file.  MEDS:   Current Outpatient Medications:     amoxicillin (AMOXIL) 875 MG tablet, Take 1 Tablet by mouth 2 times a day for 7 days., Disp: 14 Tablet, Rfl: 0    terbinafine (LAMISIL) 250 MG Tab, Take 1 Tablet by mouth every day for 90 days., Disp: 90 Tablet, Rfl: 0    NUVARING 0.12-0.015 MG/24HR vaginal ring, INSERT 1 RING VAGINALLY FOR 3 WEEKS THEN REMOVE FOR 1 WEEK, Disp: , Rfl:   ALLERGIES:   Allergies   Allergen Reactions    Latex Hives and Rash     SURGHX:   Past Surgical History:   Procedure Laterality Date    APPENDECTOMY LAPAROSCOPIC  5/10/2017    Procedure: APPENDECTOMY LAPAROSCOPIC;  Surgeon: Bonifacio Bacon M.D.;  Location: SURGERY [facility];  Service:      SOCHX:  reports that she has never smoked. She has never used smokeless tobacco. She reports current alcohol use. She reports that she does not use drugs.  FH: Reviewed with patient, not pertinent to this visit.     Review of Systems   Constitutional:  Negative for chills and fever.   HENT:  Positive for ear pain and sore throat.       Objective:   /68 (BP Location: Right arm, Patient Position: Sitting, BP Cuff Size: Adult)   Pulse 82   Temp 36.7 °C (98.1 °F) (Temporal)   Resp 16   Ht 1.6 m (5' 3\")   Wt 84.5 kg (186 lb 3.2 oz)   SpO2 94%   BMI 32.98 kg/m²   Physical Exam  Vitals and nursing note reviewed.   Constitutional:       General: She is not in acute distress.     Appearance: Normal appearance. She is well-developed. She is not ill-appearing or toxic-appearing.   HENT:      Head: Normocephalic " and atraumatic.      Right Ear: Hearing, tympanic membrane and ear canal normal.      Left Ear: Hearing and ear canal normal. A middle ear effusion is present. Tympanic membrane is erythematous.      Mouth/Throat:      Mouth: Mucous membranes are moist.      Pharynx: Uvula midline. Posterior oropharyngeal erythema present. No oropharyngeal exudate or uvula swelling.      Tonsils: No tonsillar exudate or tonsillar abscesses.   Cardiovascular:      Rate and Rhythm: Normal rate and regular rhythm.      Heart sounds: Normal heart sounds.   Pulmonary:      Effort: Pulmonary effort is normal. No respiratory distress.      Breath sounds: Normal breath sounds. No stridor. No wheezing, rhonchi or rales.   Musculoskeletal:      Comments: Normal movement in all 4 extremities   Skin:     General: Skin is warm and dry.   Neurological:      Mental Status: She is alert.      Coordination: Coordination normal.   Psychiatric:         Mood and Affect: Mood normal.       STREP A -  COVID -  FLU -  RSV -      Assessment/Plan:   Assessment    1. Non-recurrent acute suppurative otitis media of left ear without spontaneous rupture of tympanic membrane  - amoxicillin (AMOXIL) 875 MG tablet; Take 1 Tablet by mouth 2 times a day for 7 days.  Dispense: 14 Tablet; Refill: 0    2. Sore throat  - POCT GROUP A STREP, PCR    3. Acute cough  - POCT CoV-2, Flu A/B, RSV by PCR    Symptoms and presentation are consistent with left-sided ear infection which we will treat accordingly with antibiotics.  Recommend continue with over-the-counter symptomatic relief as well.    Differential diagnosis, natural history, supportive care, and indications for immediate follow-up discussed.   Patient given instructions and understanding of medications and treatment.    If not improving in 3-5 days, F/U with PCP or return to  if symptoms worsen.    Patient agreeable to plan.      Please note that this dictation was created using voice recognition software. I have  made every reasonable attempt to correct obvious errors, but I expect that there are errors of grammar and possibly content that I did not discover before finalizing the note.    Yadiel Ortiz PA-C

## 2024-01-22 ENCOUNTER — NON-PROVIDER VISIT (OUTPATIENT)
Dept: INTERNAL MEDICINE | Facility: OTHER | Age: 29
End: 2024-01-22
Payer: COMMERCIAL

## 2024-01-22 DIAGNOSIS — E66.9 OBESITY (BMI 30-39.9): ICD-10-CM

## 2024-01-22 DIAGNOSIS — E78.5 DYSLIPIDEMIA: ICD-10-CM

## 2024-01-22 PROCEDURE — 97802 MEDICAL NUTRITION INDIV IN: CPT | Performed by: DIETITIAN, REGISTERED

## 2024-01-22 NOTE — PATIENT INSTRUCTIONS
Goals:  Download either LoseIt or MyMacros to track your food intake. Track for 1-2 weeks and see what your weight does. If you maintained your weight, your new calorie goal should be 200 calories less.   Keep up your exercise!!  Eat more veggies/fruits - aim for 5 cups per day (3 cups of lettuce = 1 cup veggie equivalent)

## 2024-01-22 NOTE — PROGRESS NOTES
Josee is a 28 y.o. female here for nutrition counseling/dietitian assessment for weight management     During covid weight was 197#  And currently at 183#   She worked on dieting during covid and exercise   Stable weight since then  Looking to lose more weight   Goal weight: not sure what it should be - maybe 170's    24 hour recall:   B- protein shake powder with almond milk, avocado toast x1, egg white    L - Caesar salad made at home - natalee lettuce, keto dressing, chicken (canned), croutons, water   D - Quesdilla with carne asade x1 - with side of guacamole, water    S - -nothing     3 meals per day usually but some days she will snack at work - protein bar or cheese/crackers    Beverages: Gallon Water, no soda, no juice, coffee usually 1c/day, energy drinks 1x/day Mihir, almond milk  Alcohol: socially on weekends 1-2 drinks     Eating out: on Saturdays     Lives with   She shares cooking/shopping    is very supportive of her goals    Physical activity: 2x/day: AM 30-45 min of cardio, mid day is weight training 45miin-60 min 3 days lower body, 2 days upper body   6 days per week       PES: Obesity RT excessive calorie intake beyond needs AEB a current BMI of 32.2    Josee has a lot of good habits in place but may be overeating calories needed for weight loss. She will start tracking to better get an idea of where she is at and provided guidance on how to adjust for that. She is doing great with her exercise. Her diet is low in veggies/fruits and she had some sense of restriction for food choices so reviewed ways to help her in enjoying her favorite foods within still a calorie goal that will support weight loss. Set goals; rtc with RD in 1 month    Time spent: 30 minutes

## 2024-01-26 VITALS — WEIGHT: 183 LBS | BODY MASS INDEX: 32.42 KG/M2

## 2024-01-26 ASSESSMENT — FIBROSIS 4 INDEX: FIB4 SCORE: 0.28

## 2024-01-31 ENCOUNTER — TELEPHONE (OUTPATIENT)
Dept: INTERNAL MEDICINE | Facility: OTHER | Age: 29
End: 2024-01-31
Payer: COMMERCIAL

## 2024-01-31 NOTE — TELEPHONE ENCOUNTER
Patient called she wanted to update you on her calorie count     2000 is what was asked of her to start and she is not reaching 2000 calorie count she stated she is between 9143-8025.  Please reach out to patient with next step and what she should do.

## 2024-02-21 ENCOUNTER — NON-PROVIDER VISIT (OUTPATIENT)
Dept: INTERNAL MEDICINE | Facility: OTHER | Age: 29
End: 2024-02-21
Payer: COMMERCIAL

## 2024-02-21 DIAGNOSIS — E78.5 DYSLIPIDEMIA: ICD-10-CM

## 2024-02-21 DIAGNOSIS — E66.9 OBESITY (BMI 30-39.9): ICD-10-CM

## 2024-02-21 PROCEDURE — 97803 MED NUTRITION INDIV SUBSEQ: CPT | Performed by: DIETITIAN, REGISTERED

## 2024-02-21 NOTE — PATIENT INSTRUCTIONS
Goals:  Add in a scoop of protein powder to your oatmeal (ok to start with 1/2 scoop if you want)   Make sure to include a protein source at lunch (beans, lentils, quinoa, hard boiled eggs, greek yogurt)  Work to get in 3 oz of meat/protein at each meal - size of a deck of cards  Do your best to get in as close to 80 grams of protein per day   Ok to decrease your water a little bit - add in some salt to one per day or add in some pickles or sauerkraut   Keep up the great work!!

## 2024-02-21 NOTE — PROGRESS NOTES
Josee is here for FU with RD for weight management  Prev saw her on Jan 22nd  Weight was 183#     She started Bloom to help her with getting in more fiber and nutrients as she feels she still is sturggling with fruits and veggies    Weight today at 180#     Tracing her calories with CallsFreeCallsIt mallory and her weekly calories are under - last week below 1200 calories  Yesterday her calories were 1200 calories   She feels she is full and not sure if it is so much water or not because she is thirsty   Protein was 58 grams of protein and another day was 29 grams     Yesterday:   Breakfast - instant pack oatmeal and banana  Lunch - 4 mini tacos with potatoes with cabbage, sour cream, salsa  Dinner - spaghetti and meatballs with salad with dressing and slice of garlic bread   Snacks: greek yogurt, string cheese, cutie     From my estimates, it appears that Josee is getting in adequate protein levels so will work with her on increasing that eldon to support her weight training/exercise. Provided some suggestions on how to get in at least 80 grams per day. She also appears to be consuming high amounts of water with insufficient sodium in her diet so provided some ways to create more balance. Set goals; rtc with RD in 1 month    Time spent: 30 minutes

## 2024-02-27 VITALS — WEIGHT: 183 LBS | BODY MASS INDEX: 32.42 KG/M2

## 2024-02-27 ASSESSMENT — FIBROSIS 4 INDEX: FIB4 SCORE: 0.28

## 2024-04-22 ENCOUNTER — APPOINTMENT (OUTPATIENT)
Dept: INTERNAL MEDICINE | Facility: OTHER | Age: 29
End: 2024-04-22
Payer: COMMERCIAL

## 2024-07-01 ENCOUNTER — APPOINTMENT (OUTPATIENT)
Dept: MEDICAL GROUP | Facility: MEDICAL CENTER | Age: 29
End: 2024-07-01
Payer: COMMERCIAL

## 2024-07-01 VITALS
WEIGHT: 190 LBS | OXYGEN SATURATION: 97 % | HEIGHT: 63 IN | TEMPERATURE: 97.7 F | BODY MASS INDEX: 33.66 KG/M2 | HEART RATE: 68 BPM | DIASTOLIC BLOOD PRESSURE: 66 MMHG | SYSTOLIC BLOOD PRESSURE: 100 MMHG

## 2024-07-01 DIAGNOSIS — E78.5 DYSLIPIDEMIA: ICD-10-CM

## 2024-07-01 DIAGNOSIS — E66.9 OBESITY (BMI 30-39.9): ICD-10-CM

## 2024-07-01 PROCEDURE — 3078F DIAST BP <80 MM HG: CPT | Performed by: STUDENT IN AN ORGANIZED HEALTH CARE EDUCATION/TRAINING PROGRAM

## 2024-07-01 PROCEDURE — 99214 OFFICE O/P EST MOD 30 MIN: CPT | Performed by: STUDENT IN AN ORGANIZED HEALTH CARE EDUCATION/TRAINING PROGRAM

## 2024-07-01 PROCEDURE — 3074F SYST BP LT 130 MM HG: CPT | Performed by: STUDENT IN AN ORGANIZED HEALTH CARE EDUCATION/TRAINING PROGRAM

## 2024-07-01 ASSESSMENT — ENCOUNTER SYMPTOMS
CHILLS: 0
SHORTNESS OF BREATH: 0
DIZZINESS: 0
WEIGHT LOSS: 0
FEVER: 0
WHEEZING: 0
HEADACHES: 0
PALPITATIONS: 0
VOMITING: 0
NAUSEA: 0

## 2024-07-01 ASSESSMENT — PATIENT HEALTH QUESTIONNAIRE - PHQ9: CLINICAL INTERPRETATION OF PHQ2 SCORE: 0

## 2024-07-01 ASSESSMENT — FIBROSIS 4 INDEX: FIB4 SCORE: 0.28

## 2024-07-09 ENCOUNTER — TELEPHONE (OUTPATIENT)
Dept: HEALTH INFORMATION MANAGEMENT | Facility: OTHER | Age: 29
End: 2024-07-09
Payer: COMMERCIAL

## 2024-08-08 ENCOUNTER — DOCUMENTATION (OUTPATIENT)
Dept: MEDICAL GROUP | Facility: PHYSICIAN GROUP | Age: 29
End: 2024-08-08
Payer: COMMERCIAL

## 2024-08-08 RX ORDER — SEMAGLUTIDE 0.68 MG/ML
0.25 INJECTION, SOLUTION SUBCUTANEOUS
Qty: 3 ML | Refills: 11 | Status: SHIPPED | OUTPATIENT
Start: 2024-08-08

## 2024-08-09 NOTE — PROGRESS NOTES
"Patient Consult Note     Phone consultation    Primary care physician: Hieu Slaughter M.D.    Reason for consult: Weight loss  HPI:  Josee Go is a 28 y.o. old patient who comes in today for evaluation of above stated problem.    Allergies:  Latex    Physical Examination:  Vital signs: There were no vitals taken for this visit. There is no height or weight on file to calculate BMI.    Most Recent labs, A1c, and immunizations:    No results found for: \"INR\"  No results found for: \"POCINR\"   Lab Results   Component Value Date/Time    HBA1C 5.4 10/09/2023 10:01 AM      Lab Results   Component Value Date/Time    CHOLSTRLTOT 168 10/09/2023 10:01 AM    LDL 88 10/09/2023 10:01 AM    HDL 45 10/09/2023 10:01 AM    TRIGLYCERIDE 176 (H) 10/09/2023 10:01 AM       No results found for: \"LIPOPROTA\"   Low (favorable) = < 30 mg/dl or < 75 nmol/L  Intermediate =  30-49 mg/dl or  nmol/L  High (unfavorable) = >50 mg/dl or >125 nmol/L  Very High = >100 mg/dl or >225 nmol/L    No results found for: \"APOB\"    LDL-C < 55 = apoB <60 mg/dl  LDL-C <70 = apoB <70 mg/dl  LDL-C <100 = apo B <90 mg dl    Lab Results   Component Value Date/Time    SODIUM 137 10/09/2023 10:01 AM    POTASSIUM 4.3 10/09/2023 10:01 AM    CHLORIDE 101 10/09/2023 10:01 AM    CO2 24 10/09/2023 10:01 AM    GLUCOSE 99 10/09/2023 10:01 AM    BUN 9 10/09/2023 10:01 AM    CREATININE 0.78 10/09/2023 10:01 AM     Lab Results   Component Value Date/Time    ALKPHOSPHAT 57 10/09/2023 10:01 AM    ASTSGOT 21 10/09/2023 10:01 AM    ALTSGPT 40 10/09/2023 10:01 AM    TBILIRUBIN 0.5 10/09/2023 10:01 AM    ALBUMIN 4.5 10/09/2023 10:01 AM      Lab Results   Component Value Date/Time    RBC 4.51 10/09/2023 10:01 AM    HEMOGLOBIN 13.9 10/09/2023 10:01 AM    HEMATOCRIT 42.7 10/09/2023 10:01 AM    MCV 94.7 10/09/2023 10:01 AM    MCH 30.8 10/09/2023 10:01 AM    MCHC 32.6 10/09/2023 10:01 AM    MPV 9.8 10/09/2023 10:01 AM      No results found for: \"MALBCRT\", \"MICROALBUR\"  Lab " "Results   Component Value Date/Time    FELICITA 2.310 10/09/2023 1001     No results found for: \"FREET4\"  No results found for: \"FREET3\"  No results found for: \"THYSTIMIG\"        Medications:    Current Outpatient Medications:     Ozempic (0.25 or 0.5 MG/DOSE), 0.25 mg, Subcutaneous, Q7 DAYS    NuvaRing, INSERT 1 RING VAGINALLY FOR 3 WEEKS THEN REMOVE FOR 1 WEEK    Assessment and Plan:  Weight loss with chart diagnosis of dyslipidemia and family history of diabetes  Was informed that Ozempic will likely not be covered under her insurance based on her current diagnosis  Discussed other weight loss options, but Ozempic would be preferred if affordable  She would like to try Ozempic 0.25 mg every 7 days    3.  Lifestyle changes   Focus on eating a DASH/Mediterranean-style diet.   Exercise 30 minutes daily at least 5 days/week, as tolerated.  https://www.niddk.nih.gov/bwp    https://www.nhlbi.nih.gov/education/dash-eating-plan        Follow-up appointment in 6 week(s)    Lars Chan, PharmD, MS, BCACP, LCC  [facility]  Phone [phone] fax [phone]    This note was created using voice recognition software (Dragon). The accuracy of the dictation is limited by the abilities of the software. I have reviewed the note prior to signing, however some errors in grammar and context are still possible. If you have any questions related to this note please do not hesitate to contact our office.     CC:   Hieu Slaughter M.D.  No ref. provider found  "

## 2024-08-13 ENCOUNTER — TELEPHONE (OUTPATIENT)
Dept: MEDICAL GROUP | Facility: MEDICAL CENTER | Age: 29
End: 2024-08-13
Payer: COMMERCIAL

## 2024-08-13 NOTE — TELEPHONE ENCOUNTER
FINAL PRIOR AUTHORIZATION STATUS:    1.  Name of Medication & Dose: Semaglutide,0.25 or 0.5MG/DOS, (OZEMPIC, 0.25 OR 0.5 MG/DOSE,) 2 MG/3ML Solution Pen-injector      2. Prior Auth Status: Denied.  Reason: pt does not have Type 2 Diabetes, please see denial letter in media     3. Action Taken: Pharmacy Notified: no Patient Notified: yes

## 2024-09-24 ENCOUNTER — TELEPHONE (OUTPATIENT)
Dept: HEALTH INFORMATION MANAGEMENT | Facility: OTHER | Age: 29
End: 2024-09-24
Payer: COMMERCIAL

## 2025-02-03 ENCOUNTER — OFFICE VISIT (OUTPATIENT)
Dept: URGENT CARE | Facility: PHYSICIAN GROUP | Age: 30
End: 2025-02-03
Payer: COMMERCIAL

## 2025-02-03 VITALS
HEIGHT: 63 IN | RESPIRATION RATE: 18 BRPM | HEART RATE: 89 BPM | WEIGHT: 168 LBS | TEMPERATURE: 99.9 F | DIASTOLIC BLOOD PRESSURE: 68 MMHG | SYSTOLIC BLOOD PRESSURE: 118 MMHG | OXYGEN SATURATION: 97 % | BODY MASS INDEX: 29.77 KG/M2

## 2025-02-03 DIAGNOSIS — R05.1 ACUTE COUGH: ICD-10-CM

## 2025-02-03 PROCEDURE — 3078F DIAST BP <80 MM HG: CPT

## 2025-02-03 PROCEDURE — 99213 OFFICE O/P EST LOW 20 MIN: CPT

## 2025-02-03 PROCEDURE — 3074F SYST BP LT 130 MM HG: CPT

## 2025-02-03 RX ORDER — DEXTROMETHORPHAN HYDROBROMIDE AND PROMETHAZINE HYDROCHLORIDE 15; 6.25 MG/5ML; MG/5ML
5 SYRUP ORAL
Qty: 118 ML | Refills: 0 | Status: SHIPPED | OUTPATIENT
Start: 2025-02-03

## 2025-02-03 RX ORDER — PREDNISONE 20 MG/1
40 TABLET ORAL DAILY
Qty: 10 TABLET | Refills: 0 | Status: SHIPPED | OUTPATIENT
Start: 2025-02-03 | End: 2025-02-08

## 2025-02-03 ASSESSMENT — ENCOUNTER SYMPTOMS
VOMITING: 0
DIARRHEA: 0
NAUSEA: 0
CHILLS: 0
MYALGIAS: 0
SPUTUM PRODUCTION: 1
ABDOMINAL PAIN: 0
HEADACHES: 0
SHORTNESS OF BREATH: 0
SORE THROAT: 1
COUGH: 1
FEVER: 0

## 2025-02-03 ASSESSMENT — FIBROSIS 4 INDEX: FIB4 SCORE: 0.29

## 2025-02-03 ASSESSMENT — COPD QUESTIONNAIRES: COPD: 0

## 2025-02-03 NOTE — PROGRESS NOTES
"Subjective:   Josee Go is a 29 y.o. female who presents for Sore Throat (Coughing and spitting up green and blood. For 3 days )      Cough  This is a new problem. The current episode started in the past 7 days. The problem has been gradually worsening. The cough is Productive of blood-tinged sputum. Associated symptoms include nasal congestion, postnasal drip and a sore throat. Pertinent negatives include no chest pain, chills, ear pain, fever, headaches, myalgias, rash or shortness of breath. The symptoms are aggravated by lying down. She has tried OTC cough suppressant for the symptoms. The treatment provided mild relief. There is no history of asthma, bronchiectasis, bronchitis, COPD, emphysema, environmental allergies or pneumonia.       Review of Systems   Constitutional:  Negative for chills, fever and malaise/fatigue.   HENT:  Positive for postnasal drip and sore throat. Negative for congestion, ear pain and hearing loss.    Respiratory:  Positive for cough and sputum production. Negative for shortness of breath.    Cardiovascular:  Negative for chest pain.   Gastrointestinal:  Negative for abdominal pain, diarrhea, nausea and vomiting.   Genitourinary:  Negative for dysuria.   Musculoskeletal:  Negative for myalgias.   Skin:  Negative for rash.   Neurological:  Negative for headaches.   Endo/Heme/Allergies:  Negative for environmental allergies.       Medications, Allergies, and current problem list reviewed today in Epic.     Objective:     /68 (BP Location: Right arm, Patient Position: Sitting, BP Cuff Size: Large adult)   Pulse 89   Temp 37.7 °C (99.9 °F) (Temporal)   Resp 18   Ht 1.6 m (5' 3\")   Wt 76.2 kg (168 lb)   SpO2 97%     Physical Exam  Vitals and nursing note reviewed.   Constitutional:       General: She is not in acute distress.     Appearance: Normal appearance. She is not ill-appearing.   HENT:      Head: Normocephalic and atraumatic.      Right Ear: Tympanic membrane normal. "      Left Ear: Tympanic membrane normal.      Nose: Congestion present. No rhinorrhea.      Mouth/Throat:      Mouth: Mucous membranes are moist.      Pharynx: Uvula midline. Postnasal drip present. No pharyngeal swelling, oropharyngeal exudate, posterior oropharyngeal erythema or uvula swelling.      Tonsils: No tonsillar exudate or tonsillar abscesses.   Eyes:      Conjunctiva/sclera: Conjunctivae normal.   Cardiovascular:      Rate and Rhythm: Normal rate.      Heart sounds: Normal heart sounds.   Pulmonary:      Effort: Pulmonary effort is normal. No respiratory distress.      Breath sounds: No stridor. No wheezing or rhonchi.   Abdominal:      General: Abdomen is flat.      Palpations: Abdomen is soft.   Musculoskeletal:         General: Normal range of motion.      Cervical back: Normal range of motion.   Skin:     General: Skin is warm and dry.      Capillary Refill: Capillary refill takes less than 2 seconds.   Neurological:      Mental Status: She is alert and oriented to person, place, and time.   Psychiatric:         Mood and Affect: Mood normal.         Behavior: Behavior normal.         Assessment/Plan:       1. Acute cough  promethazine-dextromethorphan (PROMETHAZINE-DM) 6.25-15 MG/5ML syrup    predniSONE (DELTASONE) 20 MG Tab        After assessment patient here with persisting cough after recent viral illness.  Patient reports that the cough is more hacking in nature and slightly productive.  Patient does report that she has had some blood-tinged sputum as of recently.  Patient denies any shortness of breath or wheezing.  At this time patient was provided a course of prednisone along with promethazine cough syrup.  Patient instructed to use these as prescribed.  Recommend adequate hydration, rest, deep breathing and coughing, ambulation as tolerated, OTC medications.  Patient instructed to monitor for any worsening signs and symptoms of any other concerns patient was instructed to return to urgent  care for reevaluation.    Differential diagnosis, natural history, and supportive care discussed. We also reviewed side effects of medication including allergic response, GI upset, tendon injury, rash, sedation etc. Patient and/or guardian voices understanding.      Advised the patient to follow-up with the primary care physician for recheck, reevaluation, and consideration of further management.    I personally reviewed prior external notes and test results pertinent to today's visit as well as additional imaging and testing completed in clinic today.     Please note that this dictation was created using voice recognition software. I have made every reasonable attempt to correct obvious errors, but I expect that there are errors of grammar and possibly content that I did not discover before finalizing the note.    This note was electronically signed by FAN Salgado

## 2025-02-03 NOTE — LETTER
[facility] [place]  [address]  [place] [address]     February 3, 2025    Patient: Josee Go   YOB: 1995   Date of Visit: 2/3/2025       To Whom It May Concern:    Josee Go was seen and treated in our department on 2/3/2025. Please excuse Patient for today due to recent illness.     Sincerely,     NOAH Garibay.

## 2025-02-04 ENCOUNTER — TELEPHONE (OUTPATIENT)
Dept: MEDICAL GROUP | Facility: MEDICAL CENTER | Age: 30
End: 2025-02-04
Payer: COMMERCIAL

## (undated) DEVICE — SUTURE 0 VICRYL PLUS CT-2 - 27 INCH (36/BX)

## (undated) DEVICE — NEPTUNE 4 PORT MANIFOLD - (20/PK)

## (undated) DEVICE — KIT ANESTHESIA W/CIRCUIT & 3/LT BAG W/FILTER (20EA/CA)

## (undated) DEVICE — HEAD HOLDER JUNIOR/ADULT

## (undated) DEVICE — MASK ANESTHESIA ADULT  - (100/CA)

## (undated) DEVICE — CANNULA W/SEAL 5X100 Z-THRE - ADED KII (12/BX)

## (undated) DEVICE — STAPLER 45MM ARTICULATING - ENDO (3EA/BX)

## (undated) DEVICE — CHLORAPREP 26 ML APPLICATOR - ORANGE TINT(25/CA)

## (undated) DEVICE — SLEEVE, VASO, THIGH, MED

## (undated) DEVICE — KIT ROOM DECONTAMINATION

## (undated) DEVICE — PACK LAP CHOLE OR - (2EA/CA)

## (undated) DEVICE — SPONGE GAUZESTER. 2X2 4-PL - (2/PK 50PK/BX 30BX/CS)

## (undated) DEVICE — GLOVE BIOGEL SZ 6.5 SURGICAL PF LTX (50PR/BX 4BX/CA)

## (undated) DEVICE — TUBING CLEARLINK DUO-VENT - C-FLO (48EA/CA)

## (undated) DEVICE — SUCTION INSTRUMENT YANKAUER BULBOUS TIP W/O VENT (50EA/CA)

## (undated) DEVICE — SET EXTENSION WITH 2 PORTS (48EA/CA) ***PART #2C8610 IS A SUBSTITUTE*****

## (undated) DEVICE — ELECTRODE 850 FOAM ADHESIVE - HYDROGEL RADIOTRNSPRNT (50/PK)

## (undated) DEVICE — SHEAR CVD HARMONIC ACE 36CM - (6/BX) **REPLACED USE #11148 PART NUMBER HARH36***

## (undated) DEVICE — BAG RETRIEVAL 10ML (10EA/BX)

## (undated) DEVICE — SODIUM CHL IRRIGATION 0.9% 1000ML (12EA/CA)

## (undated) DEVICE — TROCAR Z THREAD12MM OPTICAL - NON BLADED (6/BX)

## (undated) DEVICE — STAPLE 45MM VASCULAR WHITE 2.5MM (12EA/BX)

## (undated) DEVICE — SUTURE 4-0 MONOCRYL PLUS PS-2 - 27 INCH (36/BX)

## (undated) DEVICE — SENSOR SPO2 NEO LNCS ADHESIVE (20/BX) SEE USER NOTES

## (undated) DEVICE — LACTATED RINGERS INJ 1000 ML - (14EA/CA 60CA/PF)

## (undated) DEVICE — DRESSING TRANSPARENT FILM TEGADERM 2.375 X 2.75"  (100EA/BX)"

## (undated) DEVICE — TUBING INSUFFLATION - (10/BX)

## (undated) DEVICE — DETERGENT RENUZYME PLUS 10 OZ PACKET (50/BX)

## (undated) DEVICE — GOWN WARMING STANDARD FLEX - (30/CA)

## (undated) DEVICE — SET LEADWIRE 5 LEAD BEDSIDE DISPOSABLE ECG (1SET OF 5/EA)

## (undated) DEVICE — SUTURE GENERAL

## (undated) DEVICE — ELECTRODE DUAL RETURN W/ CORD - (50/PK)

## (undated) DEVICE — GLOVE BIOGEL SZ 7.5 SURGICAL PF LTX - (50PR/BX 4BX/CA)

## (undated) DEVICE — TROCAR 5X100 NON BLADED Z-TH - READ KII (6/BX)

## (undated) DEVICE — LEAD SET 6 DISP. EKG NIHON KOHDEN

## (undated) DEVICE — DRESSING TRANSPARENT FILM TEGADERM 4 X 4.75" (50EA/BX)"

## (undated) DEVICE — PROTECTOR ULNA NERVE - (36PR/CA)

## (undated) DEVICE — CANISTER SUCTION 3000ML MECHANICAL FILTER AUTO SHUTOFF MEDI-VAC NONSTERILE LF DISP  (40EA/CA)

## (undated) DEVICE — GLOVE BIOGEL PI INDICATOR SZ 6.5 SURGICAL PF LF - (50/BX 4BX/CA)

## (undated) DEVICE — TUBE E-T HI-LO CUFF 6.5MM (10EA/BX)